# Patient Record
Sex: FEMALE | Race: WHITE | NOT HISPANIC OR LATINO | Employment: OTHER | URBAN - METROPOLITAN AREA
[De-identification: names, ages, dates, MRNs, and addresses within clinical notes are randomized per-mention and may not be internally consistent; named-entity substitution may affect disease eponyms.]

---

## 2017-04-28 ENCOUNTER — ALLSCRIPTS OFFICE VISIT (OUTPATIENT)
Dept: OTHER | Facility: OTHER | Age: 82
End: 2017-04-28

## 2017-05-03 ENCOUNTER — GENERIC CONVERSION - ENCOUNTER (OUTPATIENT)
Dept: OTHER | Facility: OTHER | Age: 82
End: 2017-05-03

## 2017-05-03 LAB
25(OH)D3 SERPL-MCNC: 25.8 NG/ML (ref 30–100)
A/G RATIO (HISTORICAL): 2 (ref 1.2–2.2)
ALBUMIN SERPL BCP-MCNC: 4.2 G/DL (ref 3.2–4.6)
ALP SERPL-CCNC: 67 IU/L (ref 39–117)
ALT SERPL W P-5'-P-CCNC: 9 IU/L (ref 0–32)
AST SERPL W P-5'-P-CCNC: 20 IU/L (ref 0–40)
BASOPHILS # BLD AUTO: 0 %
BASOPHILS # BLD AUTO: 0 X10E3/UL (ref 0–0.2)
BILIRUB SERPL-MCNC: 0.4 MG/DL (ref 0–1.2)
BUN SERPL-MCNC: 35 MG/DL (ref 10–36)
BUN/CREA RATIO (HISTORICAL): 24 (ref 12–28)
CALCIUM SERPL-MCNC: 10.5 MG/DL (ref 8.7–10.3)
CHLORIDE SERPL-SCNC: 103 MMOL/L (ref 96–106)
CO2 SERPL-SCNC: 24 MMOL/L (ref 18–29)
CREAT SERPL-MCNC: 1.45 MG/DL (ref 0.57–1)
DEPRECATED RDW RBC AUTO: 13.6 % (ref 12.3–15.4)
EGFR AFRICAN AMERICAN (HISTORICAL): 36 ML/MIN/1.73
EGFR-AMERICAN CALC (HISTORICAL): 31 ML/MIN/1.73
EOSINOPHIL # BLD AUTO: 0.1 X10E3/UL (ref 0–0.4)
EOSINOPHIL # BLD AUTO: 3 %
GLUCOSE SERPL-MCNC: 91 MG/DL (ref 65–99)
HCT VFR BLD AUTO: 37 % (ref 34–46.6)
HGB BLD-MCNC: 12.1 G/DL (ref 11.1–15.9)
IMM.GRANULOCYTES (CD4/8) (HISTORICAL): 0 %
IMM.GRANULOCYTES (CD4/8) (HISTORICAL): 0 X10E3/UL (ref 0–0.1)
LYMPHOCYTES # BLD AUTO: 1.5 X10E3/UL (ref 0.7–3.1)
LYMPHOCYTES # BLD AUTO: 32 %
MCH RBC QN AUTO: 30.5 PG (ref 26.6–33)
MCHC RBC AUTO-ENTMCNC: 32.7 G/DL (ref 31.5–35.7)
MCV RBC AUTO: 93 FL (ref 79–97)
MONOCYTES # BLD AUTO: 0.4 X10E3/UL (ref 0.1–0.9)
MONOCYTES (HISTORICAL): 9 %
NEUTROPHILS # BLD AUTO: 2.7 X10E3/UL (ref 1.4–7)
NEUTROPHILS # BLD AUTO: 56 %
PLATELET # BLD AUTO: 212 X10E3/UL (ref 150–379)
POTASSIUM SERPL-SCNC: 5.3 MMOL/L (ref 3.5–5.2)
RBC (HISTORICAL): 3.97 X10E6/UL (ref 3.77–5.28)
SODIUM SERPL-SCNC: 141 MMOL/L (ref 134–144)
TOT. GLOBULIN, SERUM (HISTORICAL): 2.1 G/DL (ref 1.5–4.5)
TOTAL PROTEIN (HISTORICAL): 6.3 G/DL (ref 6–8.5)
WBC # BLD AUTO: 4.8 X10E3/UL (ref 3.4–10.8)

## 2017-05-04 LAB
T4 FREE SERPL-MCNC: 1.63 NG/DL (ref 0.82–1.77)
TSH SERPL DL<=0.05 MIU/L-ACNC: 2.78 UIU/ML (ref 0.45–4.5)

## 2017-05-08 ENCOUNTER — GENERIC CONVERSION - ENCOUNTER (OUTPATIENT)
Dept: OTHER | Facility: OTHER | Age: 82
End: 2017-05-08

## 2017-05-09 ENCOUNTER — GENERIC CONVERSION - ENCOUNTER (OUTPATIENT)
Dept: OTHER | Facility: OTHER | Age: 82
End: 2017-05-09

## 2017-05-19 ENCOUNTER — GENERIC CONVERSION - ENCOUNTER (OUTPATIENT)
Dept: OTHER | Facility: OTHER | Age: 82
End: 2017-05-19

## 2017-09-07 ENCOUNTER — ALLSCRIPTS OFFICE VISIT (OUTPATIENT)
Dept: OTHER | Facility: OTHER | Age: 82
End: 2017-09-07

## 2018-01-11 NOTE — RESULT NOTES
Message   Recorded as Task   Date: 02/16/2016 07:22 AM, Created By: Choco Burgos   Task Name: Follow Up   Assigned To: Chemo Levy   Regarding Patient: Julianne Moreno, Status: In Progress   CommentDemiario Keith - 16 Feb 2016 7:22 AM     TASK CREATED  vision has some pyuria, call for she has symptoms of UTI we'll give her Bactrim double strength 1 by mouth twice a day Ã?3 days otherwise no treatment is necessary   Elma Chua - 16 Feb 2016 8:59 AM     TASK IN PROGRESS   Elma Chua - 16 Feb 2016 1:06 PM     TASK EDITED  Spoke to Mariposa Sher and gave results  She had seen her mother yesterday and she did not mention any UTI symptoms  She will check with her again to make sure and let us know  Otherwise, no antibiotic at this time is needed

## 2018-01-11 NOTE — RESULT NOTES
Message   Recorded as Task   Date: 05/04/2017 06:50 AM, Created By: Parul Lamas   Task Name: Follow Up   Assigned To: Sriram Cutler   Regarding Patient: Hamlet Rios, Status: In Progress   Kerline Marsh - 04 May 2017 6:50 AM     TASK CREATED  Her labs are stable however nice weekend we'll see her in follow-up later   Karla Eve - 08 May 2017 4:02 PM     TASK EDITED  I left a message for Gracie Kruse to call back     Karla Eve - 08 May 2017 4:02 PM     TASK IN PROGRESS   Urszula Sandoval - 09 May 2017 10:44 AM     TASK EDITED  Daughter called back, results were given to her and she will let the patient know

## 2018-01-12 NOTE — PROGRESS NOTES
Assessment    1  Hypertension (401 9) (I10)   2  Hypothyroidism (244 9) (E03 9)   3  Osteoarthritis (715 90) (M19 90)   4  Vitamin D deficiency (268 9) (E55 9)   5  Osteoporosis (733 00) (M81 0)    Plan  Hypertension    · (1) BASIC METABOLIC PROFILE; Status:Active; Requested USR:[id number];    · (1) T4, FREE; Status:Active; Requested JGJ:[id number];    · (1) TSH; Status:Active; Requested C73GBC1051;    · (1) VITAMIN D 25-HYDROXY; Status:Active; Requested PUM:84ICR8065;    · Follow-up visit in 4 Months Evaluation and Treatment  Follow-up  Status: Hold For -  Scheduling  Requested for: 2017   · Follow-up visit in 6 months Evaluation and Treatment  Follow-up  Status: Hold For -  Scheduling  Requested for: 75Aft3652  Need for prophylactic vaccination and inoculation against influenza    · Fluzone High-Dose 0 5 ML Intramuscular Suspension Prefilled Syringe  Osteoarthritis    · Diclofenac Sodium 1 % Transdermal Gel; Applied to the affected area back twice a  day  Osteoporosis    · (1) CBC/PLT/DIFF; Status:Active; Requested SJK:[id number];     Discussion/Summary    Pressure will check  Hypothyroidism replace  Osteoarthritis of the knee can take Tylenol and will give him bulk, no ointment to use  Otherwise she looks great she ambulates with a walker  She's had no falls    The blood pressure repeated by me 130/80 is excellent we will need to once the renal function and the potassium  The patient was counseled regarding diagnostic results, instructions for management, risk factor reductions, prognosis, patient and family education, impressions, risks and benefits of treatment options, importance of compliance with treatment  Possible side effects of new medications were reviewed with the patient/guardian today  The treatment plan was reviewed with the patient/guardian  The patient/guardian understands and agrees with the treatment plan      Chief Complaint  Physical   c/o bilateral knee pain        History of Present Illness  HPI: Problem 1 high blood pressure little elevated today by we'll check it again she takes lisinopril 10 mg per day  Problem #2 she has some osteoarthritis  And she has some knee pain that is occurring more so in the last couple weeks she takes Tylenol for pain relieve told her that she can probably take some diclofenac trans-derma gel that she can apply twice a day to see if it improves that should not affect the renal function very much  Chronic renal failure stage III has been stable  Depression and anxiety has been stable she takes mirtazapine 30 mg at bedtime  She takes vitamin D 3 at thousand units per day    Hypothyroidism she is on level thyroxine TSH and T4 has been normal will repeat them  Review of Systems    Constitutional: No fever, no chills, feels well, no tiredness, no recent weight gain or weight loss  Eyes: No complaints of eye pain, no red eyes, no eyesight problems, no discharge, no dry eyes, no itching of eyes  ENT: no complaints of earache, no loss of hearing, no nose bleeds, no nasal discharge, no sore throat, no hoarseness  Cardiovascular: No complaints of slow heart rate, no fast heart rate, no chest pain, no palpitations, no leg claudication, no lower extremity edema  Respiratory: No complaints of shortness of breath, no wheezing, no cough, no SOB on exertion, no orthopnea, no PND  Gastrointestinal: No complaints of abdominal pain, no constipation, no nausea or vomiting, no diarrhea, no bloody stools  Genitourinary: No complaints of dysuria, no incontinence, no pelvic pain, no dysmenorrhea, no vaginal discharge or bleeding  Musculoskeletal: No complaints of arthralgias, no myalgias, no joint swelling or stiffness, no limb pain or swelling and as noted in HPI    The patient presents with complaints of bilateral knee arthralgias  Integumentary: No complaints of skin rash or lesions, no itching, no skin wounds, no breast pain or lump  Neurological: No complaints of headache, no confusion, no convulsions, no numbness, no dizziness or fainting, no tingling, no limb weakness, no difficulty walking  Psychiatric: Not suicidal, no sleep disturbance, no anxiety or depression, no change in personality, no emotional problems  Endocrine: No complaints of proptosis, no hot flashes, no muscle weakness, no deepening of the voice, no feelings of weakness  Hematologic/Lymphatic: No complaints of swollen glands, no swollen glands in the neck, does not bleed easily, does not bruise easily  Active Problems    1  Acute bronchitis (466 0) (J20 9)   2  Basal cell carcinoma of skin (173 91) (C44 91)   3  Carbuncle, knee (680 6) (L02 439)   4  Cellulitis of right lower extremity (682 6) (L03 115)   5  Depression screening (V79 0) (Z13 89)   6  Encounter for screening mammogram for malignant neoplasm of breast (V76 12)   (Z12 31)   7  Glaucoma Screening   8  Hypertension (401 9) (I10)   9  Hypothyroidism (244 9) (E03 9)   10  Impacted cerumen, unspecified laterality (380 4) (H61 20)   11  Macular degeneration (362 50) (H35 30)   12  Need for pneumococcal vaccination (V03 82) (Z23)   13  Need for prophylactic vaccination and inoculation against influenza (V04 81) (Z23)   14  Osteoarthritis (715 90) (M19 90)   15  Osteoporosis (733 00) (M81 0)   16  Pre-op evaluation (V72 84) (Z01 818)   17  Screening for cholesterol level (V77 91) (Z13 220)   18  Screening for genitourinary condition (V81 6) (Z13 89)   19  Screening for neurological condition (V80 09) (Z13 89)   20  Squamous cell carcinoma of skin (173 92) (C44 92)   21   Vitamin D deficiency (268 9) (E55 9)    Past Medical History    · History of Depression with anxiety (300 4) (F41 8)   · Need for prophylactic vaccination and inoculation against influenza (V04 81) (Z23)    Surgical History    · History of Cataract Surgery   · History of Cholecystectomy    Family History  Father    · No pertinent family history    Social History    · Former smoker (N74 50) (O90 290)    Current Meds   1  Aspir-81 81 MG Oral Tablet Delayed Release; take 1 tablet every day; Therapy: 07Cqv9238 to Recorded   2  Capsaicin Hot Patch 0 025 % External Pad; Half or one patch in the affected area in the   back daily to take it on in the morning and off at bedtime; Therapy: 20ULT0508 to (ABSQYIZP:23XTS0556)  Requested for: 83NQB4125; Last   Rx:11Duh4649 Ordered   3  Debrox 6 5 % Otic Solution; 5-10 drops in each ear twice a day; Therapy: 44MAD7716 to [id number])  Requested for: 29NWI0752; Last   Rx:30Rse7849 Ordered   4  Levothyroxine Sodium 75 MCG Oral Tablet; TAKE 1 TABLET DAILY; Therapy: 29QOY5605 to (Last Rx:47Dmj2833)  Requested for: 68Gey4383 Ordered   5  Lisinopril 10 MG Oral Tablet; Take 1 tablet daily as directed; Therapy: 06DPS8622 to (Last Rx:33Pzw9334)  Requested for: 98Dpi5582 Ordered   6  Mirtazapine 30 MG Oral Tablet; take 1 tablet daily at bedtime; Therapy: 86NCJ5040 to (Last Daniel Re)  Requested for: 62Ceu6219 Ordered   7  Multi-Vitamin TABS; TAKE 1 TABLET DAILY; Therapy: (Recorded:89Pez3141) to Recorded   8  Tylenol 325 MG Oral Tablet; take 1-2 tablets tid prn; Therapy: 32ILA8465 to (Evaluate:27Jan2015)  Requested for: 93RFG1019; Last   Rx:94Bsh7028 Ordered   9  Vitamin D3 1000 UNIT Oral Tablet; Take one tablet daily; Therapy: 83Xxu3466 to Recorded    Allergies    1  HydroCHLOROthiazide TABS    Vitals   Recorded: 07Sep2017 10:45AM   Temperature 97 5 F   Heart Rate 80   Respiration 15   Systolic 815   Diastolic 79   Height 4 ft 11 5 in   Weight 141 lb    BMI Calculated 28   BSA Calculated 1 6     Blood pressure taken by me right arm sitting 130/70  Physical Exam    Constitutional   General appearance: Abnormal   chronically ill and overweight  Head and Face   Head and face: Normal     Palpation of the face and sinuses: No sinus tenderness      Eyes   Conjunctiva and lids: No swelling, erythema or discharge  Pupils and irises: Equal, round, reactive to light  Ears, Nose, Mouth, and Throat   External inspection of ears and nose: Normal     Otoscopic examination: Tympanic membranes translucent with normal light reflex  Canals patent without erythema  Nasal mucosa, septum, and turbinates: Normal without edema or erythema  Lips, teeth, and gums: Normal, good dentition  Oropharynx: Normal with no erythema, edema, exudate or lesions  Neck   Neck: Supple, symmetric, trachea midline, no masses  Thyroid: Normal, no thyromegaly  Pulmonary   Respiratory effort: No increased work of breathing or signs of respiratory distress  Auscultation of lungs: Clear to auscultation  Cardiovascular   Palpation of heart: Normal PMI, no thrills  Auscultation of heart: Abnormal   The rhythm was regular  Heart sounds: normal S1, normal S2 and no gallop heard  Examination of extremities for edema and/or varicosities: Normal     Abdomen   Abdomen: Non-tender, no masses  Liver and spleen: No hepatomegaly or splenomegaly  Lymphatic   Palpation of lymph nodes in neck: No lymphadenopathy  Musculoskeletal   Gait and station: Abnormal   Gait evaluation demonstrated antalgia bilaterally  Skin   Skin and subcutaneous tissue: Abnormal   dry skin  Psychiatric   Orientation to person, place, and time: Normal     Mood and affect: Normal        Health Management  Encounter for screening mammogram for malignant neoplasm of breast   Digital Bilateral Screening Mammogram With CAD; every 1 year; Last 34WGO7189; Next  Due: [id number]; Overdue  Osteoporosis   Dexa Scan; every 2 years; Last 63CPC1273; Next Due: 12PCJ1395;  Overdue    Signatures   Electronically signed by : GREG Stanton ; Sep  7 2017 11:27AM EST                       (Author)

## 2018-01-12 NOTE — MISCELLANEOUS
Message      Date: 28 Jun 2016 2:14 PM EST, Recorded By: Kj De La Cruz For: Monique Macias, Adult Child   Phone: [phone]   Mariposa Sher called to say she thinks her mother is depressed  She can't sleep, she has no appetite and she feels weak  As per Dr Damián Nam:  she should start taking Remeron (it has not been filled since Aug of 2015)  Rx for lab work was sent to the patient and appt  was given for 8 8  16  Active Problems    1  Acute bronchitis (466 0) (J20 9)   2  Basal cell carcinoma of skin (173 91) (C44 91)   3  Carbuncle, knee (680 6) (L02 439)   4  Cellulitis of right lower extremity (682 6) (L03 115)   5  Depression screening (V79 0) (Z13 89)   6  Encounter for screening mammogram for malignant neoplasm of breast (V76 12)   (Z12 31)   7  Glaucoma Screening   8  Hypertension (401 9) (I10)   9  Hypothyroidism (244 9) (E03 9)   10  Impacted cerumen, unspecified laterality (380 4) (H61 20)   11  Macular degeneration (362 50) (H35 30)   12  Need for pneumococcal vaccination (V03 82) (Z23)   13  Need for prophylactic vaccination and inoculation against influenza (V04 81) (Z23)   14  Osteoarthritis (715 90) (M19 90)   15  Osteoporosis (733 00) (M81 0)   16  Pre-op evaluation (V72 84) (Z01 818)   17  Screening for cholesterol level (V77 91) (Z13 220)   18  Screening for genitourinary condition (V81 6) (Z13 89)   19  Screening for neurological condition (V80 09) (Z13 89)   20  Squamous cell carcinoma of skin (173 92) (C44 92)   21  Vitamin D deficiency (268 9) (E55 9)    Current Meds   1  Aspir-81 81 MG Oral Tablet Delayed Release; take 1 tablet every day; Therapy: 05Xcp9218 to Recorded   2  Capsaicin Hot Patch 0 025 % External Pad; Half or one patch in the affected area in the   back daily to take it on in the morning and off at bedtime; Therapy: 82ZIQ6356 to (BIEDZCPF:82ATM1561)  Requested for: 44UOO3597; Last   Rx:64Uwn5924 Ordered   3   Debrox 6 5 % Otic Solution; 5-10 drops in each ear twice a day; Therapy: 76LFU9733 to 72 470 15 18)  Requested for: 07CRZ1922; Last   Rx:14Max2537 Ordered   4  Levothyroxine Sodium 75 MCG Oral Tablet; Take 1 tablet daily; Therapy: 50ZVP2792 to (Last Rx:87Lud6035)  Requested for: 63ERL3417 Ordered   5  Lisinopril 10 MG Oral Tablet; Take 1 tablet daily as directed; Therapy: 61ZLN8816 to (Last Rx:28Wjk2502)  Requested for: 55WSI9719 Ordered   6  Mirtazapine 30 MG Oral Tablet; take 1 tablet daily at bedtime; Therapy: 16BHH7768 to (Last Rx:61Dsh1261)  Requested for: 28Grq4757 Ordered   7  Multi-Vitamin TABS; TAKE 1 TABLET DAILY; Therapy: (Recorded:16Zzm9987) to Recorded   8  Tylenol 325 MG Oral Tablet; take 1-2 tablets tid prn; Therapy: 03MVI4914 to (Evaluate:27Jan2015)  Requested for: 85KUG1836; Last   Rx:50Uxm1349 Ordered   9  Vitamin D3 1000 UNIT Oral Tablet; Take one tablet daily; Therapy: 08Vrn5908 to Recorded    Allergies    1  Hydrochlorothiazide TABS    Plan  Hypertension, Hypothyroidism, Osteoarthritis    · (1) CBC/PLT/DIFF; Status:Active; Requested for:79Nsb7056;    · (1) COMPREHENSIVE METABOLIC PANEL; Status:Active; Requested for:25Bvv9530;    · (1) GGT; Status:Active; Requested for:72Eyw1502;    · (1) TSH; Status:Active; Requested for:73Pse1427;    · (1) URINALYSIS (will reflex a microscopy if leukocytes, occult blood, protein or nitrites  are not within normal limits); Status:Active; Requested for:03Kna5718;    · (1) T4, FREE; Status:Active; Requested for:71Jde2331;   Hypertension, Hypothyroidism, Screening for cholesterol level    · (1) C-REACTIVE PROTEIN; Status:Active - Retrospective Authorization; Requested  for:23Sqq3312;    · (1) LD (LDH); Status:Active - Retrospective Authorization; Requested for:57Cxe6092;    · (1) SED RATE; Status:Active - Retrospective Authorization;  Requested for:66Kec4544;     Signatures   Electronically signed by : GREG Reeves ; Jun 28 2016  3:35PM EST

## 2018-01-13 VITALS
HEIGHT: 60 IN | WEIGHT: 141 LBS | DIASTOLIC BLOOD PRESSURE: 79 MMHG | HEART RATE: 80 BPM | BODY MASS INDEX: 27.68 KG/M2 | SYSTOLIC BLOOD PRESSURE: 163 MMHG | TEMPERATURE: 97.5 F | RESPIRATION RATE: 15 BRPM

## 2018-01-13 NOTE — PROGRESS NOTES
Assessment   1  Hypertension (401 9) (I10)  2  Hypothyroidism (244 9) (E03 9)  3  Osteoarthritis (715 90) (M19 90)  4  Vitamin D deficiency (268 9) (E55 9)  5  Macular degeneration (362 50) (H35 30)    Plan  Hypertension, Hypothyroidism, Osteoarthritis, Vitamin D deficiency    · (1) BASIC METABOLIC PROFILE; Status:Active; Requested for:48Ufy9804;    · (1) CBC/PLT/DIFF; Status:Active; Requested for:50Jqy0712;    · (1) T4, FREE; Status:Active; Requested for:00Ooc5484;    · (1) TSH; Status:Active; Requested for:73Tmt9587;    · (1) VITAMIN D 25-HYDROXY; Status:Active; Requested for:83Ubc9077;    · Follow-up visit in 4 Months Evaluation and Treatment  Follow-up  Status: Hold For -  Scheduling  Requested for: 81Nan7683   · Follow-up visit in 6 months Evaluation and Treatment  Follow-up  Status: Hold For -  Scheduling  Requested for: 68Eqb5639  Need for prophylactic vaccination and inoculation against influenza    · Administered: Fluzone High-Dose 0 5 ML Intramuscular Suspension Prefilled Syringe    Discussion/Summary  health maintenance visit Currently, she eats a healthy diet  I believe she is doing wonderful 52 years of age  Her blood pressure was controlled she is active she's had no history of falls no history of incontinence  She takes her medication she felt that her own finances  She is socially engaged at the assisted living have no further recommendations will see her back in 4-6 months  She had the influenza vaccine today and she had the Prevnar vaccine this summer and a pneumococcal vaccine last year  She is up-to-date on immunizations  Chief Complaint  Annual medical exam       History of Present Illness  , Adult Female: The patient is being seen for a health maintenance evaluation  General Health: The patient's health since the last visit is described as good  She does not have regular dental visits  She complains of vision problems  She denies hearing loss   Immunizations status: up to date    Lifestyle:  She consumes a diverse and healthy diet  Reproductive health: the patient is postmenopausal    Screening:   metabolic screening reviewed and current  risk screening reviewed and current  HPI: 1 high blood pressure control lisinopril 10 mg per day  We'll continue same medications  Problem #2 hypothyroidism on thyroxine 75 Âµg per day her TSH and T4 is normal was recently review    Problem #3 history of depression and is now in remission she takes Remeron 30 mg at bedtime  She also takes vitamin D and aspirin for cardiovascular protection  She is here for an annual wellness visit  She resides at [facility]  She was given the high dose flu vaccine today  Review of Systems    Constitutional: No fever, no chills, feels well, no tiredness, no recent weight gain or weight loss  Eyes: No complaints of eye pain, no red eyes, no eyesight problems, no discharge, no dry eyes, no itching of eyes  ENT: no complaints of earache, no loss of hearing, no nose bleeds, no nasal discharge, no sore throat, no hoarseness  Cardiovascular: No complaints of slow heart rate, no fast heart rate, no chest pain, no palpitations, no leg claudication, no lower extremity edema  Respiratory: No complaints of shortness of breath, no wheezing, no cough, no SOB on exertion, no orthopnea, no PND  Gastrointestinal: No complaints of abdominal pain, no constipation, no nausea or vomiting, no diarrhea, no bloody stools  Genitourinary: No complaints of dysuria, no incontinence, no pelvic pain, no dysmenorrhea, no vaginal discharge or bleeding  Musculoskeletal: No complaints of arthralgias, no myalgias, no joint swelling or stiffness, no limb pain or swelling and as noted in HPI  Integumentary: No complaints of skin rash or lesions, no itching, no skin wounds, no breast pain or lump     Neurological: No complaints of headache, no confusion, no convulsions, no numbness, no dizziness or fainting, no tingling, no limb weakness, no difficulty walking  Psychiatric: Not suicidal, no sleep disturbance, no anxiety or depression, no change in personality, no emotional problems  Endocrine: No complaints of proptosis, no hot flashes, no muscle weakness, no deepening of the voice, no feelings of weakness  Hematologic/Lymphatic: No complaints of swollen glands, no swollen glands in the neck, does not bleed easily, does not bruise easily  Active Problems   1  Acute bronchitis (466 0) (J20 9)  2  Basal cell carcinoma of skin (173 91) (C44 91)  3  Carbuncle, knee (680 6) (L02 439)  4  Cellulitis of right lower extremity (682 6) (L03 115)  5  Depression screening (V79 0) (Z13 89)  6  Encounter for screening mammogram for malignant neoplasm of breast (V76 12)   (Z12 31)  7  Glaucoma Screening  8  Hypertension (401 9) (I10)  9  Hypothyroidism (244 9) (E03 9)  10  Impacted cerumen, unspecified laterality (380 4) (H61 20)  11  Macular degeneration (362 50) (H35 30)  12  Need for pneumococcal vaccination (V03 82) (Z23)  13  Need for prophylactic vaccination and inoculation against influenza (V04 81) (Z23)  14  Osteoarthritis (715 90) (M19 90)  15  Osteoporosis (733 00) (M81 0)  16  Pre-op evaluation (V72 84) (Z01 818)  17  Screening for cholesterol level (V77 91) (Z13 220)  18  Screening for genitourinary condition (V81 6) (Z13 89)  19  Screening for neurological condition (V80 09) (Z13 89)  20  Squamous cell carcinoma of skin (173 92) (C44 92)  21  Vitamin D deficiency (268 9) (E55 9)    Past Medical History    · History of Depression with anxiety (300 4) (F41 8)   · Need for prophylactic vaccination and inoculation against influenza (V04 81) (Z23)    Surgical History    · History of Cataract Surgery   · History of Cholecystectomy    Family History  Father    · No pertinent family history    Social History    · Former smoker (J96 96) (E22 055)    Current Meds  1   Aspir-81 81 MG Oral Tablet Delayed Release; take 1 tablet every day; Therapy: 48Ffu1946 to Recorded  2  Capsaicin Hot Patch 0 025 % External Pad; Half or one patch in the affected area in the   back daily to take it on in the morning and off at bedtime; Therapy: 87HUD8097 to (ZFWRLWYJ:99ZEQ0908)  Requested for: 08XAY7152; Last   Rx:29Jve3355 Ordered  3  Debrox 6 5 % Otic Solution; 5-10 drops in each ear twice a day; Therapy: 04KKF5626 to [phone])  Requested for: 38WVE8344; Last   Rx:40Gds9184 Ordered  4  Levothyroxine Sodium 75 MCG Oral Tablet; Take 1 tablet daily; Therapy: 35EKL6202 to (Last Rx:84Vzm9676)  Requested for: 93Lax5228 Ordered  5  Lisinopril 10 MG Oral Tablet; Take 1 tablet daily as directed; Therapy: 44HYQ8527 to (Last Rx:52Zcn0759)  Requested for: 55Chj0711 Ordered  6  Mirtazapine 30 MG Oral Tablet; take 1 tablet daily at bedtime; Therapy: 20OAJ3380 to (Last Rx:01Feq1794)  Requested for: 68Yjy8491 Ordered  7  Multi-Vitamin TABS; TAKE 1 TABLET DAILY; Therapy: (Recorded:50Kiq4355) to Recorded  8  Tylenol 325 MG Oral Tablet; take 1-2 tablets tid prn; Therapy: 57OEE7033 to (Evaluate:27Jan2015)  Requested for: 46BEW9894; Last   Rx:37Cwq6605 Ordered  9  Vitamin D3 1000 UNIT Oral Tablet; Take one tablet daily; Therapy: 40Gfk6179 to Recorded    Allergies   1  Hydrochlorothiazide TABS    Vitals   Recorded: 71RQS9465 10:85NJ   Systolic 463   Diastolic 82   Heart Rate 76   Respiration 15   Temperature 97 3 F   Height 4 ft 11 5 in   Weight 141 lb    BMI Calculated 28   BSA Calculated 1 6     Pressure 145/80 left arm sitting  Physical Exam    Constitutional   General appearance: Abnormal   appears healthy and within normal limits of ideal weight  Head and Face   Head and face: Normal     Palpation of the face and sinuses: No sinus tenderness  Eyes   Conjunctiva and lids: No swelling, erythema or discharge  Pupils and irises: Equal, round, reactive to light      Ears, Nose, Mouth, and Throat   External inspection of ears and nose: Normal     Lips, teeth, and gums: Normal, good dentition  Oropharynx: Normal with no erythema, edema, exudate or lesions  Neck   Neck: Supple, symmetric, trachea midline, no masses  Thyroid: Normal, no thyromegaly  Pulmonary   Respiratory effort: No increased work of breathing or signs of respiratory distress  Auscultation of lungs: Clear to auscultation  Cardiovascular   Palpation of heart: Normal PMI, no thrills  Auscultation of heart: Normal rate and rhythm, normal S1 and S2, no murmurs  Examination of extremities for edema and/or varicosities: Abnormal   bilateral ankle 1+ pitting edema  varicosities noted bilaterally  Abdomen   Abdomen: Non-tender, no masses  Liver and spleen: No hepatomegaly or splenomegaly  Lymphatic   Palpation of lymph nodes in neck: No lymphadenopathy  Musculoskeletal   Gait and station: Abnormal   Gait evaluation demonstrated ataxia  Muscle strength/tone: Normal     Skin   Skin and subcutaneous tissue: Normal without rashes or lesions  Psychiatric   Orientation to person, place, and time: Normal     Mood and affect: Normal        Results/Data  PHQ-2 Adult Depression Screening 27Gut1860 08:36AM User, s     Test Name Result Flag Reference   PHQ-2 Adult Depression Score 0     Over the last two weeks, how often have you been bothered by any of the following problems? Little interest or pleasure in doing things: Not at all - 0  Feeling down, depressed, or hopeless: Not at all - 0   PHQ-2 Adult Depression Screening Negative         Health Management  Encounter for screening mammogram for malignant neoplasm of breast   Digital Bilateral Screening Mammogram With CAD; every 1 year; Last 22ALJ6914; Next  Due: M1310148; Overdue  Osteoporosis   Dexa Scan; every 2 years; Last 30JMS2203; Next Due: 61ILE4267; Overdue    Future Appointments    Date/Time Provider Specialty Site   12/14/2016 09:45 AM GREG Marks   [facility] Signatures   Electronically signed by : GREG Zelaya ; Sep 15 2016  9:02AM EST                       (Author)

## 2018-01-14 VITALS
RESPIRATION RATE: 15 BRPM | BODY MASS INDEX: 28.07 KG/M2 | HEIGHT: 60 IN | TEMPERATURE: 97.8 F | HEART RATE: 64 BPM | DIASTOLIC BLOOD PRESSURE: 73 MMHG | WEIGHT: 143 LBS | SYSTOLIC BLOOD PRESSURE: 135 MMHG

## 2018-01-23 NOTE — PROGRESS NOTES
Assessment    1  Pre-op evaluation (V72 84) (Z01 818)   2  Squamous cell carcinoma of skin (173 92) (C44 92)   3  Hypertension (401 9) (I10)   4  Hypothyroidism (244 9) (E03 9)   5  Vitamin D deficiency (268 9) (E55 9)    Plan  Hypertension, Hypothyroidism, Osteoporosis, Pre-op evaluation, Vitamin D deficiency    · (1) APTT; Status:Active; Requested for:41Blb4287;    · (1) CBC/PLT/DIFF; Status:Active; Requested for:52Dba3737;    · (1) COMPREHENSIVE METABOLIC PANEL; Status:Active; Requested for:79Zin5576;    · (1) GGT; Status:Active; Requested for:40Eqj8388;    · (1) PT WITH INR; Status:Active; Requested for:17Xth7789;    · (1) T4, FREE; Status:Active; Requested for:88Zre2976;    · (1) TSH; Status:Active; Requested for:92Fge4215;    · (1) URINALYSIS (will reflex a microscopy if leukocytes, occult blood, protein or nitrites  are not within normal limits); Status:Active; Requested for:52Zks0696;    · (1) VITAMIN D 25-HYDROXY; Status:Active; Requested for:00Ena6585;    · Follow-up visit in 2 months Evaluation and Treatment  Follow-up  Status: Complete   Done: 54WMC6475   · Follow-up visit in 3 months Evaluation and Treatment  Follow-up  Status: Complete   Done: 60DWJ8983  Pre-op evaluation    · EKG/ECG- POC; Status:Complete - Retrospective Authorization;   Done: 11VRA6184  09:57AM    Discussion/Summary  Surgical Clearance: She is at a LOW risk from a cardiovascular standpoint at this time without any additional cardiac testing  Reevaluation needed, if she should present with symptoms prior to surgery/procedure  Return to Work: Approved  The day of surgery she will do the following  Take lisinopril 10 mg her blood pressure pill  Level thyroxine 75 Âµg for her thyroid  Hold the aspirin hold the multivitamins and all other medications  The next day she can resume her routine medications    EKG shows sinus rhythm rate of 60 no acute changes normal tracing        Chief Complaint  Pre-op eval: remove nodular lesion from fight leg / Dr Rissa Marsh / no date  History of Present Illness  Pre-Op Visit (Brief): The patient is being seen for a preoperative visit  Surgical Risk Assessment:   Prior Anesthesia: She had prior anesthesia  Exercise Capacity: unable to walk two flights of stairs without symptoms, but able to walk four blocks without symptoms  Lifestyle Factors: denies alcohol use, denies tobacco use and denies illegal drug use  Symptoms: no symptoms  HPI: She is here for preop evaluation to remove the lesion on the right leg  Started be done in the hospital under anesthesia  Also had a nodular lesion that has been there for quite some time and is going to be performed by Dr Rissa Marsh  Blood pressure control no chest and palpitation shortness of breath    Hypothyroidism she takes level thyroxine         Review of Systems    Constitutional: No fever, no chills, feels well, no tiredness, no recent weight gain or weight loss  Eyes: No complaints of eye pain, no red eyes, no eyesight problems, no discharge, no dry eyes, no itching of eyes  ENT: no complaints of earache, no loss of hearing, no nose bleeds, no nasal discharge, no sore throat, no hoarseness  Cardiovascular: No complaints of slow heart rate, no fast heart rate, no chest pain, no palpitations, no leg claudication, no lower extremity edema  Respiratory: No complaints of shortness of breath, no wheezing, no cough, no SOB on exertion, no orthopnea, no PND  Gastrointestinal: No complaints of abdominal pain, no constipation, no nausea or vomiting, no diarrhea, no bloody stools  Genitourinary: No complaints of dysuria, no incontinence, no pelvic pain, no dysmenorrhea, no vaginal discharge or bleeding  Musculoskeletal: No complaints of arthralgias, no myalgias, no joint swelling or stiffness, no limb pain or swelling  Integumentary: No complaints of skin rash or lesions, no itching, no skin wounds, no breast pain or lump     Neurological: No complaints of headache, no confusion, no convulsions, no numbness, no dizziness or fainting, no tingling, no limb weakness, no difficulty walking  Psychiatric: Not suicidal, no sleep disturbance, no anxiety or depression, no change in personality, no emotional problems  Endocrine: No complaints of proptosis, no hot flashes, no muscle weakness, no deepening of the voice, no feelings of weakness  Hematologic/Lymphatic: No complaints of swollen glands, no swollen glands in the neck, does not bleed easily, does not bruise easily  Active Problems    1  Acute bronchitis (466 0) (J20 9)   2  Basal cell carcinoma of skin (173 91) (C44 91)   3  Carbuncle, knee (680 6) (L02 439)   4  Cellulitis of right lower extremity (682 6) (L03 115)   5  Depression screening (V79 0) (Z13 89)   6  Encounter for screening mammogram for malignant neoplasm of breast (V76 12)   (Z12 31)   7  Glaucoma Screening   8  Hypertension (401 9) (I10)   9  Hypothyroidism (244 9) (E03 9)   10  Impacted cerumen, unspecified laterality (380 4) (H61 20)   11  Macular degeneration (362 50) (H35 30)   12  Need for pneumococcal vaccination (V03 82) (Z23)   13  Need for prophylactic vaccination and inoculation against influenza (V04 81) (Z23)   14  Osteoarthritis (715 90) (M19 90)   15  Osteoporosis (733 00) (M81 0)   16  Screening for cholesterol level (V77 91) (Z13 220)   17  Screening for genitourinary condition (V81 6) (Z13 89)   18  Screening for neurological condition (V80 09) (Z13 89)   19  Squamous cell carcinoma of skin (173 92) (C44 92)   20   Vitamin D deficiency (268 9) (E55 9)    Past Medical History    · History of Depression with anxiety (300 4) (F41 8)   · Need for prophylactic vaccination and inoculation against influenza (V04 81) (Z23)    Surgical History    · History of Cataract Surgery   · History of Cholecystectomy    Family History    · No pertinent family history    Social History    · Former smoker (F50 90) (O28 651)    Current Meds   1  Aspir-81 81 MG Oral Tablet Delayed Release; take 1 tablet every day; Therapy: 64Dpr9599 to Recorded   2  Capsaicin Hot Patch 0 025 % External Pad; Half or one patch in the affected area in the   back daily to take it on in the morning and off at bedtime; Therapy: 72QMP1138 to (HTQSCOK57PUN5028)  Requested for: 13NZA3228; Last   Rx:77Epm7440 Ordered   3  Debrox 6 5 % Otic Solution; 5-10 drops in each ear twice a day; Therapy: 37HEL8953 to [phone])  Requested for: 02FFP5744; Last   Rx:39Axu4098 Ordered   4  Levothyroxine Sodium 75 MCG Oral Tablet; Take 1 tablet daily; Therapy: 65ULW8506 to (Last Rx:90Bps2759)  Requested for: 96FVP5014 Ordered   5  Lisinopril 10 MG Oral Tablet; Take 1 tablet daily as directed; Therapy: 51EZJ6042 to (Last Rx:07Emu0012)  Requested for: 09SAA7437 Ordered   6  Mirtazapine 30 MG Oral Tablet; take 1 tablet daily at bedtime; Therapy: 67FXF2333 to (Last Rx:2015)  Requested for: 45Eak7749 Ordered   7  Multi-Vitamin TABS; TAKE 1 TABLET DAILY; Therapy: (Recorded:82Hyf7672) to Recorded   8  Tylenol 325 MG Oral Tablet; take 1-2 tablets tid prn; Therapy: 48AWP4315 to (Evaluate:2015)  Requested for: 08XLU2097; Last   Rx:68Fuz2936 Ordered   9  Vitamin D3 1000 UNIT Oral Tablet; Take one tablet daily; Therapy: 45Ouj6950 to Recorded    Allergies    1  Hydrochlorothiazide TABS    Vitals   Recorded: 57EQZ4873 09:42AM   Temperature 97 9 F   Heart Rate 80   Respiration 15   Systolic 985   Diastolic 76   Height 4 ft 11 5 in   Weight 147 lb 4 oz   BMI Calculated 29 24   BSA Calculated 1 63     Physical Exam    Constitutional   General appearance: Abnormal   appears healthy and overweight  Head and Face   Head and face: Normal     Palpation of the face and sinuses: No sinus tenderness  Eyes   Conjunctiva and lids: No swelling, erythema or discharge  Pupils and irises: Equal, round, reactive to light      Ears, Nose, Mouth, and Throat   External inspection of ears and nose: Normal     Otoscopic examination: Tympanic membranes translucent with normal light reflex  Canals patent without erythema  Nasal mucosa, septum, and turbinates: Normal without edema or erythema  Lips, teeth, and gums: Normal, good dentition  Oropharynx: Normal with no erythema, edema, exudate or lesions  Neck   Neck: Supple, symmetric, trachea midline, no masses  Thyroid: Normal, no thyromegaly  Pulmonary   Respiratory effort: No increased work of breathing or signs of respiratory distress  Auscultation of lungs: Clear to auscultation  Cardiovascular   Palpation of heart: Normal PMI, no thrills  Auscultation of heart: Normal rate and rhythm, normal S1 and S2, no murmurs  Carotid pulses: 2+ bilaterally  Examination of extremities for edema and/or varicosities: Normal     Abdomen   Abdomen: Non-tender, no masses  Liver and spleen: No hepatomegaly or splenomegaly  Lymphatic   Palpation of lymph nodes in neck: No lymphadenopathy  Skin   Examination of the skin for lesions: Abnormal   A single, pink-salmon nodule(s)  on the right shin  Neurologic   Cranial nerves: Cranial nerves II-XII intact  Psychiatric   Orientation to person, place, and time: Normal     Mood and affect: Normal        Results/Data  EKG/ECG- POC 46NYS2100 09:57AM Annabel Skill     Test Name Result Flag Reference   EKG/ECG 2 10 16         End of Encounter Meds    1  Mirtazapine 30 MG Oral Tablet; take 1 tablet daily at bedtime; Therapy: 34EBK2977 to (Last Rx:11Aug2015)  Requested for: 11Aug2015 Ordered    2  Lisinopril 10 MG Oral Tablet; Take 1 tablet daily as directed; Therapy: 57VVH7324 to (Last Rx:16Gbg6210)  Requested for: 86HZK4295 Ordered    3  Tylenol 325 MG Oral Tablet; take 1-2 tablets tid prn; Therapy: 19LSB2764 to (Evaluate:27Jan2015)  Requested for: 44ZMT5765; Last   Rx:47Vek1115 Ordered    4  Levothyroxine Sodium 75 MCG Oral Tablet;  Take 1 tablet daily; Therapy: 59TGH8865 to (Last Rx:94Juy8099)  Requested for: 66JOZ8490 Ordered    5  Debrox 6 5 % Otic Solution; 5-10 drops in each ear twice a day; Therapy: 97CPK6457 to 96 263734)  Requested for: 58KRR6389; Last   Rx:54Uno8153 Ordered    6  Capsaicin Hot Patch 0 025 % External Pad; Half or one patch in the affected area in the   back daily to take it on in the morning and off at bedtime; Therapy: 02CDG1381 to (ZLLEFXHN:46WSR7848)  Requested for: 71XMP9723; Last   Rx:35Esm1887 Ordered    7  Vitamin D3 1000 UNIT Oral Tablet; Take one tablet daily; Therapy: 28Xls4875 to Recorded    8  Aspir-81 81 MG Oral Tablet Delayed Release; take 1 tablet every day; Therapy: 70Buw0473 to Recorded   9  Multi-Vitamin TABS; TAKE 1 TABLET DAILY; Therapy: (Recorded:24Dvb9906) to Recorded    Future Appointments    Date/Time Provider Specialty Site   04/26/2016 09:45 AM GREG Ring   Internal Medicine [facility]     Signatures   Electronically signed by : GREG Stanton ; Feb 10 2016 10:26AM EST                       (Author)

## 2018-01-23 NOTE — CONSULTS
Chief Complaint  Pre-op eval: remove nodular lesion from fight leg / Dr Joan Marlow / no date  History of Present Illness  Pre-Op Visit (Brief): The patient is being seen for a preoperative visit  Surgical Risk Assessment:   Prior Anesthesia: She had prior anesthesia  Exercise Capacity: unable to walk two flights of stairs without symptoms, but able to walk four blocks without symptoms  Lifestyle Factors: denies alcohol use, denies tobacco use and denies illegal drug use  Symptoms: no symptoms  HPI: She is here for preop evaluation to remove the lesion on the right leg  Started be done in the hospital under anesthesia  Also had a nodular lesion that has been there for quite some time and is going to be performed by Dr Joan Marlow  Blood pressure control no chest and palpitation shortness of breath    Hypothyroidism she takes level thyroxine         Review of Systems    Constitutional: No fever, no chills, feels well, no tiredness, no recent weight gain or weight loss  Eyes: No complaints of eye pain, no red eyes, no eyesight problems, no discharge, no dry eyes, no itching of eyes  ENT: no complaints of earache, no loss of hearing, no nose bleeds, no nasal discharge, no sore throat, no hoarseness  Cardiovascular: No complaints of slow heart rate, no fast heart rate, no chest pain, no palpitations, no leg claudication, no lower extremity edema  Respiratory: No complaints of shortness of breath, no wheezing, no cough, no SOB on exertion, no orthopnea, no PND  Gastrointestinal: No complaints of abdominal pain, no constipation, no nausea or vomiting, no diarrhea, no bloody stools  Genitourinary: No complaints of dysuria, no incontinence, no pelvic pain, no dysmenorrhea, no vaginal discharge or bleeding  Musculoskeletal: No complaints of arthralgias, no myalgias, no joint swelling or stiffness, no limb pain or swelling     Integumentary: No complaints of skin rash or lesions, no itching, no skin wounds, no breast pain or lump  Neurological: No complaints of headache, no confusion, no convulsions, no numbness, no dizziness or fainting, no tingling, no limb weakness, no difficulty walking  Psychiatric: Not suicidal, no sleep disturbance, no anxiety or depression, no change in personality, no emotional problems  Endocrine: No complaints of proptosis, no hot flashes, no muscle weakness, no deepening of the voice, no feelings of weakness  Hematologic/Lymphatic: No complaints of swollen glands, no swollen glands in the neck, does not bleed easily, does not bruise easily  Active Problems    1  Acute bronchitis (466 0) (J20 9)   2  Basal cell carcinoma of skin (173 91) (C44 91)   3  Carbuncle, knee (680 6) (L02 439)   4  Cellulitis of right lower extremity (682 6) (L03 115)   5  Depression screening (V79 0) (Z13 89)   6  Encounter for screening mammogram for malignant neoplasm of breast (V76 12)   (Z12 31)   7  Glaucoma Screening   8  Hypertension (401 9) (I10)   9  Hypothyroidism (244 9) (E03 9)   10  Impacted cerumen, unspecified laterality (380 4) (H61 20)   11  Macular degeneration (362 50) (H35 30)   12  Need for pneumococcal vaccination (V03 82) (Z23)   13  Need for prophylactic vaccination and inoculation against influenza (V04 81) (Z23)   14  Osteoarthritis (715 90) (M19 90)   15  Osteoporosis (733 00) (M81 0)   16  Screening for cholesterol level (V77 91) (Z13 220)   17  Screening for genitourinary condition (V81 6) (Z13 89)   18  Screening for neurological condition (V80 09) (Z13 89)   19  Squamous cell carcinoma of skin (173 92) (C44 92)   20   Vitamin D deficiency (268 9) (E55 9)    Past Medical History    · History of Depression with anxiety (300 4) (F41 8)   · Need for prophylactic vaccination and inoculation against influenza (V04 81) (Z23)    Surgical History    · History of Cataract Surgery   · History of Cholecystectomy    Family History    · No pertinent family history    Social History    · Former smoker (B14 80) (V64 136)    Current Meds   1  Aspir-81 81 MG Oral Tablet Delayed Release; take 1 tablet every day; Therapy: 74Juw9893 to Recorded   2  Capsaicin Hot Patch 0 025 % External Pad; Half or one patch in the affected area in the   back daily to take it on in the morning and off at bedtime; Therapy: 80OUZ0910 to (AGITDUDL:48PAP1241)  Requested for: 54MYQ0377; Last   Rx:70Exh9819 Ordered   3  Debrox 6 5 % Otic Solution; 5-10 drops in each ear twice a day; Therapy: 74OPH0669 to 927 70 139)  Requested for: 12OCV6052; Last   Rx:55Xbt0772 Ordered   4  Levothyroxine Sodium 75 MCG Oral Tablet; Take 1 tablet daily; Therapy: 57GQJ7828 to (Last Rx:50Usp9200)  Requested for: 67JEQ6713 Ordered   5  Lisinopril 10 MG Oral Tablet; Take 1 tablet daily as directed; Therapy: 39ATN0892 to (Last Rx:14Hcn2783)  Requested for: 90UJX8851 Ordered   6  Mirtazapine 30 MG Oral Tablet; take 1 tablet daily at bedtime; Therapy: 36VHZ3659 to (Last Rx:11Aug2015)  Requested for: 43Jul7484 Ordered   7  Multi-Vitamin TABS; TAKE 1 TABLET DAILY; Therapy: (Recorded:88Aan2246) to Recorded   8  Tylenol 325 MG Oral Tablet; take 1-2 tablets tid prn; Therapy: 41BFL9830 to (Evaluate:27Jan2015)  Requested for: 31DFP6780; Last   Rx:15Jso2364 Ordered   9  Vitamin D3 1000 UNIT Oral Tablet; Take one tablet daily; Therapy: 58Toy2921 to Recorded    Allergies    1  Hydrochlorothiazide TABS    Vitals  Signs [Data Includes: Current Encounter]    Temperature: 97 9 F  Heart Rate: 80  Respiration: 15  Systolic: 527  Diastolic: 76  Height: 4 ft 11 5 in  Weight: 147 lb 4 oz  BMI Calculated: 29 24  BSA Calculated: 1 63    Physical Exam    Constitutional   General appearance: Abnormal   appears healthy and overweight  Head and Face   Head and face: Normal     Palpation of the face and sinuses: No sinus tenderness  Eyes   Conjunctiva and lids: No swelling, erythema or discharge      Pupils and irises: Equal, round, reactive to light  Ears, Nose, Mouth, and Throat   External inspection of ears and nose: Normal     Otoscopic examination: Tympanic membranes translucent with normal light reflex  Canals patent without erythema  Nasal mucosa, septum, and turbinates: Normal without edema or erythema  Lips, teeth, and gums: Normal, good dentition  Oropharynx: Normal with no erythema, edema, exudate or lesions  Neck   Neck: Supple, symmetric, trachea midline, no masses  Thyroid: Normal, no thyromegaly  Pulmonary   Respiratory effort: No increased work of breathing or signs of respiratory distress  Auscultation of lungs: Clear to auscultation  Cardiovascular   Palpation of heart: Normal PMI, no thrills  Auscultation of heart: Normal rate and rhythm, normal S1 and S2, no murmurs  Carotid pulses: 2+ bilaterally  Examination of extremities for edema and/or varicosities: Normal     Abdomen   Abdomen: Non-tender, no masses  Liver and spleen: No hepatomegaly or splenomegaly  Lymphatic   Palpation of lymph nodes in neck: No lymphadenopathy  Skin   Examination of the skin for lesions: Abnormal   A single, pink-salmon nodule(s)  on the right shin  Neurologic   Cranial nerves: Cranial nerves II-XII intact  Psychiatric   Orientation to person, place, and time: Normal     Mood and affect: Normal        Results/Data  EKG/ECG- POC [id number] 09:57AM Artelia Rigoberto     Test Name Result Flag Reference   EKG/ECG 2 10 16         Assessment    1  Pre-op evaluation (V72 84) (Z01 818)   2  Squamous cell carcinoma of skin (173 92) (C44 92)   3  Hypertension (401 9) (I10)   4  Hypothyroidism (244 9) (E03 9)   5  Vitamin D deficiency (268 9) (E55 9)    Plan  Hypertension, Hypothyroidism, Osteoporosis, Pre-op evaluation, Vitamin D deficiency    · (1) APTT; Status:Active; Requested for:57Xpm0232;    · (1) CBC/PLT/DIFF; Status:Active;  Requested for:31Tmb8009;    · (1) COMPREHENSIVE METABOLIC PANEL; Status:Active; Requested for:10Feb2016;    · (1) GGT; Status:Active; Requested for:10Feb2016;    · (1) PT WITH INR; Status:Active; Requested for:10Feb2016;    · (1) T4, FREE; Status:Active; Requested for:10Feb2016;    · (1) TSH; Status:Active; Requested for:10Feb2016;    · (1) URINALYSIS (will reflex a microscopy if leukocytes, occult blood, protein or nitrites are  not within normal limits); Status:Active; Requested for:10Feb2016;    · (1) VITAMIN D 25-HYDROXY; Status:Active; Requested for:10Feb2016;    · Follow-up visit in 2 months Evaluation and Treatment  Follow-up  Status: Complete   Done: 62QJR4995   · Follow-up visit in 3 months Evaluation and Treatment  Follow-up  Status: Complete   Done: 18CJH4598  Pre-op evaluation    · EKG/ECG- POC; Status:Complete - Retrospective Authorization;   Done: 80OCY1293  09:57AM    Discussion/Summary  Surgical Clearance: She is at a LOW risk from a cardiovascular standpoint at this time without any additional cardiac testing  Reevaluation needed, if she should present with symptoms prior to surgery/procedure  Return to Work: Approved  The day of surgery she will do the following  Take lisinopril 10 mg her blood pressure pill  Level thyroxine 75 Âµg for her thyroid  Hold the aspirin hold the multivitamins and all other medications  The next day she can resume her routine medications    EKG shows sinus rhythm rate of 60 no acute changes normal tracing  End of Encounter Meds    1  Mirtazapine 30 MG Oral Tablet; take 1 tablet daily at bedtime; Therapy: 38HID5651 to (Last Rx:11Aug2015)  Requested for: 11Aug2015 Ordered    2  Lisinopril 10 MG Oral Tablet; Take 1 tablet daily as directed; Therapy: 57FWJ1246 to (Last Rx:65Bmp4868)  Requested for: 14ZBQ4384 Ordered    3  Tylenol 325 MG Oral Tablet; take 1-2 tablets tid prn; Therapy: 10HTU0571 to (Evaluate:27Jan2015)  Requested for: 14OYX7812; Last   Rx:46Gev7054 Ordered    4  Levothyroxine Sodium 75 MCG Oral Tablet;  Take 1 tablet daily; Therapy: 25CNP4417 to (Last Rx:37Npb8369)  Requested for: 79XWL9555 Ordered    5  Debrox 6 5 % Otic Solution; 5-10 drops in each ear twice a day; Therapy: 54NML5693 to [phone])  Requested for: 68AIM8739; Last   Rx:94Kko3007 Ordered    6  Capsaicin Hot Patch 0 025 % External Pad; Half or one patch in the affected area in the   back daily to take it on in the morning and off at bedtime; Therapy: 82YLM6689 to (QCFFSBBX:43PEH4023)  Requested for: 15OIK8791; Last   Rx:78Lkl1197 Ordered    7  Vitamin D3 1000 UNIT Oral Tablet; Take one tablet daily; Therapy: 34Lrt1393 to Recorded    8  Aspir-81 81 MG Oral Tablet Delayed Release; take 1 tablet every day; Therapy: 26Moq3991 to Recorded   9  Multi-Vitamin TABS; TAKE 1 TABLET DAILY;    Therapy: (Recorded:58Xnu2451) to Recorded    Signatures   Electronically signed by : GREG Larson ; Feb 10 2016 10:26AM EST                       (Author)

## 2018-02-15 DIAGNOSIS — E03.9 ACQUIRED HYPOTHYROIDISM: ICD-10-CM

## 2018-02-15 DIAGNOSIS — I10 ESSENTIAL HYPERTENSION: Primary | ICD-10-CM

## 2018-02-15 RX ORDER — LEVOTHYROXINE SODIUM 0.07 MG/1
TABLET ORAL
Qty: 90 TABLET | Refills: 0 | Status: SHIPPED | OUTPATIENT
Start: 2018-02-15 | End: 2018-05-16 | Stop reason: SDUPTHER

## 2018-02-15 RX ORDER — LISINOPRIL 10 MG/1
TABLET ORAL
Qty: 90 TABLET | Refills: 0 | Status: SHIPPED | OUTPATIENT
Start: 2018-02-15 | End: 2018-05-16 | Stop reason: SDUPTHER

## 2018-02-24 DIAGNOSIS — F32.A DEPRESSION, UNSPECIFIED DEPRESSION TYPE: Primary | ICD-10-CM

## 2018-02-24 RX ORDER — MIRTAZAPINE 30 MG/1
TABLET, FILM COATED ORAL
Qty: 90 TABLET | Refills: 0 | Status: SHIPPED | OUTPATIENT
Start: 2018-02-24 | End: 2018-05-25 | Stop reason: SDUPTHER

## 2018-04-12 ENCOUNTER — OFFICE VISIT (OUTPATIENT)
Dept: INTERNAL MEDICINE CLINIC | Facility: CLINIC | Age: 83
End: 2018-04-12
Payer: COMMERCIAL

## 2018-04-12 VITALS
HEART RATE: 61 BPM | BODY MASS INDEX: 27.57 KG/M2 | WEIGHT: 138.8 LBS | RESPIRATION RATE: 15 BRPM | SYSTOLIC BLOOD PRESSURE: 133 MMHG | DIASTOLIC BLOOD PRESSURE: 69 MMHG | TEMPERATURE: 97.4 F

## 2018-04-12 DIAGNOSIS — F41.8 DEPRESSION WITH ANXIETY: ICD-10-CM

## 2018-04-12 DIAGNOSIS — E03.9 ACQUIRED HYPOTHYROIDISM: ICD-10-CM

## 2018-04-12 DIAGNOSIS — I10 ESSENTIAL HYPERTENSION: Primary | ICD-10-CM

## 2018-04-12 PROCEDURE — 99214 OFFICE O/P EST MOD 30 MIN: CPT | Performed by: INTERNAL MEDICINE

## 2018-04-12 RX ORDER — ASPIRIN 81 MG/1
1 TABLET ORAL DAILY
COMMUNITY
Start: 2013-09-18 | End: 2019-01-15 | Stop reason: HOSPADM

## 2018-04-12 RX ORDER — BIOTIN 1 MG
1 TABLET ORAL DAILY
COMMUNITY
Start: 2014-12-22 | End: 2018-10-10 | Stop reason: CLARIF

## 2018-04-12 NOTE — ASSESSMENT & PLAN NOTE
Is in remission continue on Remeron she says she has gone" to all but she is still hanging in there ambulating with a walker no complaints in joining a extensive family of Children's and grandchildren

## 2018-04-12 NOTE — PATIENT INSTRUCTIONS
DASH Eating Plan   WHAT YOU NEED TO KNOW:   The DASH (Dietary Approaches to Stop Hypertension) Eating Plan is designed to help prevent or lower high blood pressure  It can also help to lower LDL (bad) cholesterol and decrease your risk of heart disease  The plan is low in sodium, sugar, unhealthy fats, and total fat  It is high in potassium, calcium, magnesium, and fiber  These nutrients are added when you eat more fruits, vegetables, and whole grains  DISCHARGE INSTRUCTIONS:   Your sodium limit each day: Your dietitian will tell you how much sodium is safe for you to have each day  People with high blood pressure should have no more than 1,500 to 2,300 mg of sodium in a day  A teaspoon (tsp) of salt has 2,300 mg of sodium  This may seem like a difficult goal, but small changes to the foods you eat can make a big difference  Your healthcare provider or dietitian can help you create a meal plan that follows your sodium limit  How to limit sodium:   · Read food labels  Food labels can help you choose foods that are low in sodium  The amount of sodium is listed in milligrams (mg)  The % Daily Value (DV) column tells you how much of your daily needs are met by 1 serving of the food for each nutrient listed  Choose foods that have less than 5% of the DV of sodium  These foods are considered low in sodium  Foods that have 20% or more of the DV of sodium are considered high in sodium  Avoid foods that have more than 300 mg of sodium in each serving  Choose foods that say low-sodium, reduced-sodium, or no salt added on the food label  · Avoid salt  Do not salt food at the table, and add very little salt to foods during cooking  Use herbs and spices, such as onions, garlic, and salt-free seasonings to add flavor to foods  Try lemon or lime juice or vinegar to give foods a tart flavor  Use hot peppers or a small amount of hot pepper sauce to add a spicy flavor to foods  · Ask about salt substitutes    Ask your healthcare provider if you may use salt substitutes  Some salt substitutes have ingredients that can be harmful if you have certain health conditions  · Choose foods carefully at restaurants  Meals from restaurants, especially fast food restaurants, are often high in sodium  Some restaurants have nutrition information that tells you the amount of sodium in their foods  Ask to have your food prepared with less, or no salt  What you need to know about fats:   · Include healthy fats  Examples are unsaturated fats and omega-3 fatty acids  Unsaturated fats are found in soybean, canola, olive, or sunflower oil, and liquid and soft tub margarines  Omega-3 fatty acids are found in fatty fish, such as salmon, tuna, mackerel, and sardines  It is also found in flaxseed oil and ground flaxseed  · Avoid unhealthy fats  Do not eat unhealthy fats, such as saturated fats and trans fats  Saturated fats are found in foods that contain fat from animals  Examples are fatty meats, whole milk, butter, cream, and other dairy foods  It is also found in shortening, stick margarine, palm oil, and coconut oil  Trans fats are found in fried foods, crackers, chips, and baked goods made with margarine or shortening  Foods to include: With the DASH eating plan, you need to eat a certain number of servings from each food group  This will help you get enough of certain nutrients and limit others  The amount of servings you should eat depends on how many calories you need  Your dietitian can tell you how many calories you need  The number of servings listed next to the food groups below are for people who need about 2,000 calories each day    · Grains:  6 to 8 servings (3 of these servings should be whole-grain foods)    ¨ 1 slice of whole-grain bread     ¨ 1 ounce of dry cereal    ¨ ½ cup of cooked cereal, pasta, or brown rice    · Vegetables and fruits:  4 to 5 servings of fruits and 4 to 5 servings of vegetables    ¨ 1 medium fruit    ¨ ½ cup of frozen, canned (no added salt), or chopped fresh vegetables     ¨ ½ cup of fresh, frozen, dried, or canned fruit (canned in light syrup or fruit juice)    ¨ ½ cup of vegetable or fruit juice    · Dairy:  2 to 3 servings    ¨ 1 cup of nonfat (skim) or 1% milk    ¨ 1½ ounces of fat-free or low-fat cheese    ¨ 6 ounces of nonfat or low-fat yogurt    · Lean meat, poultry, and fish:  6 ounces or less    [employer] (chicken, turkey) with no skin    ¨ Fish (especially fatty fish, such as salmon, fresh tuna, or mackerel)    ¨ Lean beef and pork (loin, round, extra lean hamburger)    ¨ Egg whites and egg substitutes    · Nuts, seeds, and legumes:  4 to 5 servings each week    ¨ ½ cup of cooked beans and peas    ¨ 1½ ounces of unsalted nuts    ¨ 2 tablespoons of peanut butter or seeds    · Sweets and added sugars:  5 or less each week    ¨ 1 tablespoon of sugar, jelly, or jam    ¨ ½ cup of sorbet or gelatin    ¨ 1 cup of lemonade    · Fats:  2 to 3 servings each week    ¨ 1 teaspoon of soft margarine or vegetable oil    ¨ 1 tablespoon of mayonnaise    ¨ 2 tablespoons of salad dressing  Foods to avoid:   · Grains:      [employer], such as doughnuts, pastries, cookies, and biscuits (high in fat and sugar)    ¨ Mixes for cornbread and biscuits, packaged foods, such as bread stuffing, rice and pasta mixes, macaroni and cheese, and instant cereals (high in sodium)    · Fruits and vegetables:      ¨ Regular, canned vegetables (high in sodium)    ¨ Sauerkraut, pickled vegetables, and other foods prepared in brine (high in sodium)    ¨ Fried vegetables or vegetables in butter or high-fat sauces    ¨ Fruit in cream or butter sauce (high in fat)    · Dairy:      ¨ Whole milk, 2% milk, and cream (high in fat)    ¨ Regular cheese and processed cheese (high in fat and sodium)    · Meats and protein foods:      ¨ Smoked or cured meat, such as corned beef, leal, ham, hot dogs, and sausage (high in fat and sodium)    ¨ Canned beans and canned meats or spreads, such as potted meats, sardines, anchovies, and imitation seafood (high in sodium)    ¨ Deli or lunch meats, such as bologna, ham, turkey, and roast beef (high in sodium)    ¨ High-fat meat (T-bone steak, regular hamburger, and ribs)    ¨ Whole eggs and egg yolks (high in fat)    · Other:      ¨ Seasonings made with salt, such as garlic salt, celery salt, onion salt, seasoned salt, meat tenderizers, and monosodium glutamate (MSG)    ¨ Miso soup and canned or dried soup mixes (high in sodium)    ¨ Regular soy sauce, barbecue sauce, teriyaki sauce, steak sauce, Worcestershire sauce, and most flavored vinegars (high in sodium)    ¨ Regular condiments, such as mustard, ketchup, and salad dressings (high in sodium)    ¨ Gravy and sauces, such as Yamil or cheese sauces (high in sodium and fat)    ¨ Drinks high in sugar, such as soda or fruit drinks    [employer] foods, such as salted chips, popcorn, pretzels, pork rinds, salted crackers, and salted nuts    ¨ Frozen foods, such as dinners, entrees, vegetables with sauces, and breaded meats (high in sodium)  Other guidelines to follow:   · Maintain a healthy weight  Your risk for heart disease is higher if you are overweight  Your healthcare provider may suggest that you lose weight if you are overweight  You can lose weight by eating fewer calories and foods that have added sugars and fat  The DASH meal plan can help you do this  Decrease calories by eating smaller portions at each meal and fewer snacks  Ask your healthcare provider for more information about how to lose weight  · Exercise regularly  Regular exercise can help you reach or maintain a healthy weight  Regular exercise can also help decrease your blood pressure and improve your cholesterol levels  Get 30 minutes or more of moderate exercise each day of the week  To lose weight, get at least 60 minutes of exercise   Talk to your healthcare provider about the best exercise program for you  · Limit alcohol  Women should limit alcohol to 1 drink a day  Men should limit alcohol to 2 drinks a day  A drink of alcohol is 12 ounces of beer, 5 ounces of wine, or 1½ ounces of liquor  © 2017 [address] Information is for End User's use only and may not be sold, redistributed or otherwise used for commercial purposes  All illustrations and images included in CareNotes® are the copyrighted property of [employer]  or Pradip Baez  The above information is an  only  It is not intended as medical advice for individual conditions or treatments  Talk to your doctor, nurse or pharmacist before following any medical regimen to see if it is safe and effective for you      Your blood pressure stable schedule labs done so we can adjust her levothyroxine if needed continue all other medications I will see her back in 6 months in the fall and give you the flu shot

## 2018-04-17 LAB
CHOLEST SERPL-MCNC: 161 MG/DL (ref 50–200)
HDLC SERPL-MCNC: 53 MG/DL (ref 40–60)
LDLC SERPL DIRECT ASSAY-MCNC: 91 MG/DL
TRIGL SERPL-MCNC: 86 MG/DL (ref ?–150)

## 2018-04-22 LAB
25(OH)D3+25(OH)D2 SERPL-MCNC: 22.3 NG/ML (ref 30–100)
ALBUMIN SERPL-MCNC: 4 G/DL (ref 3.2–4.6)
ALBUMIN/GLOB SERPL: 1.7 {RATIO} (ref 1.2–2.2)
ALP SERPL-CCNC: 68 IU/L (ref 39–117)
ALT SERPL-CCNC: 8 IU/L (ref 0–32)
AMBIG ABBREV DEFAULT: NORMAL
APPEARANCE UR: CLEAR
AST SERPL-CCNC: 18 IU/L (ref 0–40)
BACTERIA URNS QL MICRO: NORMAL
BASOPHILS # BLD AUTO: 0 X10E3/UL (ref 0–0.2)
BASOPHILS NFR BLD AUTO: 0 %
BILIRUB SERPL-MCNC: 0.4 MG/DL (ref 0–1.2)
BILIRUB UR QL STRIP: NEGATIVE
BUN SERPL-MCNC: 35 MG/DL (ref 10–36)
BUN/CREAT SERPL: 25 (ref 12–28)
CALCIUM SERPL-MCNC: 10.4 MG/DL (ref 8.7–10.3)
CHLORIDE SERPL-SCNC: 105 MMOL/L (ref 96–106)
CHOLEST SERPL-MCNC: 161 MG/DL (ref 100–199)
CO2 SERPL-SCNC: 22 MMOL/L (ref 18–29)
COLOR UR: YELLOW
CREAT SERPL-MCNC: 1.41 MG/DL (ref 0.57–1)
EOSINOPHIL # BLD AUTO: 0.2 X10E3/UL (ref 0–0.4)
EOSINOPHIL NFR BLD AUTO: 3 %
EPI CELLS #/AREA URNS HPF: NORMAL /HPF
ERYTHROCYTE [DISTWIDTH] IN BLOOD BY AUTOMATED COUNT: 14 % (ref 12.3–15.4)
GGT SERPL-CCNC: 10 IU/L (ref 0–60)
GLOBULIN SER-MCNC: 2.4 G/DL (ref 1.5–4.5)
GLUCOSE SERPL-MCNC: 98 MG/DL (ref 65–99)
GLUCOSE UR QL: NEGATIVE
HCT VFR BLD AUTO: 38.5 % (ref 34–46.6)
HDLC SERPL-MCNC: 53 MG/DL
HGB BLD-MCNC: 12.6 G/DL (ref 11.1–15.9)
HGB UR QL STRIP: NEGATIVE
IMM GRANULOCYTES # BLD: 0 X10E3/UL (ref 0–0.1)
IMM GRANULOCYTES NFR BLD: 0 %
KETONES UR QL STRIP: NEGATIVE
LDLC SERPL CALC-MCNC: 91 MG/DL (ref 0–99)
LEUKOCYTE ESTERASE UR QL STRIP: ABNORMAL
LYMPHOCYTES # BLD AUTO: 2.3 X10E3/UL (ref 0.7–3.1)
LYMPHOCYTES NFR BLD AUTO: 40 %
MAGNESIUM SERPL-MCNC: 2.1 MG/DL (ref 1.6–2.3)
MCH RBC QN AUTO: 30.9 PG (ref 26.6–33)
MCHC RBC AUTO-ENTMCNC: 32.7 G/DL (ref 31.5–35.7)
MCV RBC AUTO: 94 FL (ref 79–97)
MICRO URNS: ABNORMAL
MONOCYTES # BLD AUTO: 0.5 X10E3/UL (ref 0.1–0.9)
MONOCYTES NFR BLD AUTO: 9 %
MUCOUS THREADS URNS QL MICRO: PRESENT
NEUTROPHILS # BLD AUTO: 2.7 X10E3/UL (ref 1.4–7)
NEUTROPHILS NFR BLD AUTO: 48 %
NITRITE UR QL STRIP: NEGATIVE
PH UR STRIP: 5 [PH] (ref 5–7.5)
PLATELET # BLD AUTO: 229 X10E3/UL (ref 150–379)
POTASSIUM SERPL-SCNC: 5 MMOL/L (ref 3.5–5.2)
PROT SERPL-MCNC: 6.4 G/DL (ref 6–8.5)
PROT UR QL STRIP: NEGATIVE
RBC # BLD AUTO: 4.08 X10E6/UL (ref 3.77–5.28)
RBC #/AREA URNS HPF: NORMAL /HPF
SL AMB EGFR AFRICAN AMERICAN: 37 ML/MIN/1.73
SL AMB EGFR NON AFRICAN AMERICAN: 32 ML/MIN/1.73
SODIUM SERPL-SCNC: 140 MMOL/L (ref 134–144)
SP GR UR: 1.02 (ref 1–1.03)
TRIGL SERPL-MCNC: 86 MG/DL (ref 0–149)
TSH SERPL DL<=0.005 MIU/L-ACNC: 1.8 UIU/ML (ref 0.45–4.5)
UROBILINOGEN UR STRIP-ACNC: 0.2 EU/DL (ref 0.2–1)
WBC # BLD AUTO: 5.6 X10E3/UL (ref 3.4–10.8)
WBC #/AREA URNS HPF: NORMAL /HPF

## 2018-05-16 DIAGNOSIS — I10 ESSENTIAL HYPERTENSION: ICD-10-CM

## 2018-05-16 DIAGNOSIS — E03.9 ACQUIRED HYPOTHYROIDISM: ICD-10-CM

## 2018-05-16 RX ORDER — LEVOTHYROXINE SODIUM 0.07 MG/1
TABLET ORAL
Qty: 90 TABLET | Refills: 0 | Status: SHIPPED | OUTPATIENT
Start: 2018-05-16 | End: 2018-08-14 | Stop reason: SDUPTHER

## 2018-05-16 RX ORDER — LISINOPRIL 10 MG/1
TABLET ORAL
Qty: 90 TABLET | Refills: 0 | Status: SHIPPED | OUTPATIENT
Start: 2018-05-16 | End: 2018-08-14 | Stop reason: SDUPTHER

## 2018-05-25 DIAGNOSIS — F32.A DEPRESSION, UNSPECIFIED DEPRESSION TYPE: ICD-10-CM

## 2018-05-25 RX ORDER — MIRTAZAPINE 30 MG/1
TABLET, FILM COATED ORAL
Qty: 90 TABLET | Refills: 0 | Status: SHIPPED | OUTPATIENT
Start: 2018-05-25 | End: 2018-08-23 | Stop reason: SDUPTHER

## 2018-07-30 LAB
A-TOCOPHEROL VIT E SERPL-MCNC: 11.6 MG/L (ref 9–29)
GAMMA TOCOPHEROL SERPL-MCNC: 0.8 MG/L (ref 0.5–4.9)

## 2018-08-08 ENCOUNTER — OFFICE VISIT (OUTPATIENT)
Dept: INTERNAL MEDICINE CLINIC | Facility: CLINIC | Age: 83
End: 2018-08-08
Payer: COMMERCIAL

## 2018-08-08 VITALS
RESPIRATION RATE: 16 BRPM | WEIGHT: 138.6 LBS | HEART RATE: 78 BPM | DIASTOLIC BLOOD PRESSURE: 70 MMHG | SYSTOLIC BLOOD PRESSURE: 142 MMHG | TEMPERATURE: 97.9 F | BODY MASS INDEX: 27.53 KG/M2

## 2018-08-08 DIAGNOSIS — I10 ESSENTIAL HYPERTENSION: ICD-10-CM

## 2018-08-08 DIAGNOSIS — E03.9 ACQUIRED HYPOTHYROIDISM: Primary | ICD-10-CM

## 2018-08-08 DIAGNOSIS — F41.8 DEPRESSION WITH ANXIETY: ICD-10-CM

## 2018-08-08 DIAGNOSIS — E55.9 VITAMIN D DEFICIENCY: ICD-10-CM

## 2018-08-08 PROCEDURE — 1101F PT FALLS ASSESS-DOCD LE1/YR: CPT | Performed by: INTERNAL MEDICINE

## 2018-08-08 PROCEDURE — 1160F RVW MEDS BY RX/DR IN RCRD: CPT | Performed by: INTERNAL MEDICINE

## 2018-08-08 PROCEDURE — 99214 OFFICE O/P EST MOD 30 MIN: CPT | Performed by: INTERNAL MEDICINE

## 2018-08-08 NOTE — PROGRESS NOTES
Assessment/Plan:    Hypothyroidism  TSH last checked in April was within normal limits, continue current dose of levothyroxine    Essential hypertension  At goal for her age, no episodes of orthostatic hypertension, continue current dose of lisinopril    Depression with anxiety  Continue Remeron, cognitively intact, able to self medicate    Vitamin D deficiency  Continue daily vitamin-D supplement    Up-to-date on pneumonia vaccination, influenza vaccine to be done in the fall, no indication of continuing screening mammography or colonoscopy at her age  Discussed the importance of adequate nutrition and continued physical activity as tolerated  Diagnoses and all orders for this visit:    Acquired hypothyroidism    Essential hypertension    Depression with anxiety    Vitamin D deficiency          Subjective:   Chief Complaint   Patient presents with    Physical Exam    Leg Swelling        Patient ID: Gloria Hussein is a 80 y o  female  She comes in for follow-up of hypertension, hypothyroidism, depression and anxiety  She notes that she was told that she has had increased swelling in the legs but she notes that they have been feeling better for the last few days  No significant pain  Able to ambulate without any difficulty  Taking blood pressure medication regularly  Mood has been stable and does sleep well  The following portions of the patient's history were reviewed and updated as appropriate: current medications, past medical history, past social history and past surgical history  PHQ-9 Depression Screening    PHQ-9:    Frequency of the following problems over the past two weeks:                Current Outpatient Prescriptions:     aspirin (ASPIR-81) 81 mg EC tablet, Take 1 tablet by mouth daily, Disp: , Rfl:     Cholecalciferol (VITAMIN D3) 1000 units CAPS, Take 1 tablet by mouth daily, Disp: , Rfl:     levothyroxine 75 mcg tablet, TAKE 1 TABLET DAILY  , Disp: 90 tablet, Rfl: 0   lisinopril (ZESTRIL) 10 mg tablet, TAKE 1 TABLET DAILY AS DIRECTED, Disp: 90 tablet, Rfl: 0    mirtazapine (REMERON) 30 mg tablet, TAKE 1 TABLET DAILY AT BEDTIME, Disp: 90 tablet, Rfl: 0    Review of Systems   Constitutional: Negative for fatigue, fever and unexpected weight change  HENT: Negative for ear pain, hearing loss and sore throat  Eyes: Negative for pain and discharge  Respiratory: Negative for cough, chest tightness and shortness of breath  Cardiovascular: Negative for chest pain and palpitations  Gastrointestinal: Negative for abdominal pain, blood in stool, constipation, diarrhea and nausea  Genitourinary: Negative for dysuria, frequency and hematuria  Musculoskeletal: Negative for arthralgias and joint swelling  Skin: Negative for rash  Allergic/Immunologic: Negative for immunocompromised state  Neurological: Negative for dizziness and headaches  Hematological: Negative for adenopathy  Psychiatric/Behavioral: Negative for confusion and sleep disturbance  Objective:  /70 (BP Location: Left arm, Patient Position: Sitting, Cuff Size: Standard)   Pulse 78   Temp 97 9 °F (36 6 °C)   Resp 16   Wt 62 9 kg (138 lb 9 6 oz)   BMI 27 53 kg/m²      Physical Exam   Constitutional: She appears well-developed and well-nourished  HENT:   Head: Normocephalic and atraumatic  Right Ear: Tympanic membrane normal    Left Ear: Tympanic membrane normal    Nose: Nose normal    Mouth/Throat: Oropharynx is clear and moist  No posterior oropharyngeal edema or posterior oropharyngeal erythema  Eyes: Conjunctivae are normal  Pupils are equal, round, and reactive to light  Right eye exhibits no discharge  Neck: Normal range of motion  Neck supple  No thyromegaly present  Cardiovascular: Normal rate, regular rhythm, S1 normal, S2 normal and normal heart sounds  PMI is not displaced  No murmur heard    No pedal edema, no calf tenderness, negative Hohmann sign, dry skin but no ulceration   Pulmonary/Chest: Effort normal and breath sounds normal  No accessory muscle usage  No apnea  No respiratory distress  She has no rhonchi  She has no rales  Abdominal: Soft  Normal appearance and bowel sounds are normal  She exhibits no shifting dullness  There is no hepatosplenomegaly  There is no tenderness  There is no rebound and no CVA tenderness  Musculoskeletal: Normal range of motion  She exhibits no edema or tenderness  Lymphadenopathy:     She has no cervical adenopathy  Neurological: She is alert  Skin: Skin is warm and intact  No rash noted  Psychiatric: She has a normal mood and affect  Her speech is normal    Nursing note and vitals reviewed  No results found for this or any previous visit (from the past 1008 hour(s))  ]    No results found

## 2018-08-14 DIAGNOSIS — I10 ESSENTIAL HYPERTENSION: ICD-10-CM

## 2018-08-14 DIAGNOSIS — E03.9 ACQUIRED HYPOTHYROIDISM: ICD-10-CM

## 2018-08-14 RX ORDER — LEVOTHYROXINE SODIUM 0.07 MG/1
TABLET ORAL
Qty: 90 TABLET | Refills: 0 | Status: SHIPPED | OUTPATIENT
Start: 2018-08-14 | End: 2018-11-12 | Stop reason: SDUPTHER

## 2018-08-14 RX ORDER — LISINOPRIL 10 MG/1
TABLET ORAL
Qty: 90 TABLET | Refills: 0 | Status: SHIPPED | OUTPATIENT
Start: 2018-08-14 | End: 2018-11-12 | Stop reason: SDUPTHER

## 2018-08-23 DIAGNOSIS — F32.A DEPRESSION, UNSPECIFIED DEPRESSION TYPE: ICD-10-CM

## 2018-08-23 RX ORDER — MIRTAZAPINE 30 MG/1
TABLET, FILM COATED ORAL
Qty: 90 TABLET | Refills: 0 | Status: SHIPPED | OUTPATIENT
Start: 2018-08-23 | End: 2018-11-21 | Stop reason: SDUPTHER

## 2018-10-10 ENCOUNTER — OFFICE VISIT (OUTPATIENT)
Dept: INTERNAL MEDICINE CLINIC | Facility: CLINIC | Age: 83
End: 2018-10-10
Payer: COMMERCIAL

## 2018-10-10 VITALS
HEART RATE: 63 BPM | BODY MASS INDEX: 27.01 KG/M2 | TEMPERATURE: 97.2 F | DIASTOLIC BLOOD PRESSURE: 73 MMHG | RESPIRATION RATE: 15 BRPM | SYSTOLIC BLOOD PRESSURE: 150 MMHG | WEIGHT: 136 LBS

## 2018-10-10 DIAGNOSIS — E55.9 VITAMIN D DEFICIENCY: ICD-10-CM

## 2018-10-10 DIAGNOSIS — H61.23 CERUMEN DEBRIS ON TYMPANIC MEMBRANE OF BOTH EARS: ICD-10-CM

## 2018-10-10 DIAGNOSIS — E03.9 ACQUIRED HYPOTHYROIDISM: Primary | ICD-10-CM

## 2018-10-10 DIAGNOSIS — F41.8 DEPRESSION WITH ANXIETY: ICD-10-CM

## 2018-10-10 DIAGNOSIS — I10 ESSENTIAL HYPERTENSION: ICD-10-CM

## 2018-10-10 DIAGNOSIS — Z23 NEED FOR INFLUENZA VACCINATION: ICD-10-CM

## 2018-10-10 PROCEDURE — 90662 IIV NO PRSV INCREASED AG IM: CPT | Performed by: INTERNAL MEDICINE

## 2018-10-10 PROCEDURE — 99214 OFFICE O/P EST MOD 30 MIN: CPT | Performed by: INTERNAL MEDICINE

## 2018-10-10 PROCEDURE — 4040F PNEUMOC VAC/ADMIN/RCVD: CPT | Performed by: INTERNAL MEDICINE

## 2018-10-10 PROCEDURE — G0008 ADMIN INFLUENZA VIRUS VAC: HCPCS | Performed by: INTERNAL MEDICINE

## 2018-10-10 PROCEDURE — 1036F TOBACCO NON-USER: CPT | Performed by: INTERNAL MEDICINE

## 2018-10-10 RX ORDER — DIPHENOXYLATE HYDROCHLORIDE AND ATROPINE SULFATE 2.5; .025 MG/1; MG/1
1 TABLET ORAL DAILY
COMMUNITY

## 2018-10-10 NOTE — PROGRESS NOTES
Assessment/Plan:    No problem-specific Assessment & Plan notes found for this encounter  There are no diagnoses linked to this encounter  Subjective:      Patient ID: Marita Galeas is a 80 y o  female  No chief complaint on file  Current Outpatient Prescriptions:     aspirin (ASPIR-81) 81 mg EC tablet, Take 1 tablet by mouth daily, Disp: , Rfl:     Cholecalciferol (VITAMIN D3) 1000 units CAPS, Take 1 tablet by mouth daily, Disp: , Rfl:     levothyroxine 75 mcg tablet, TAKE 1 TABLET DAILY  , Disp: 90 tablet, Rfl: 0    lisinopril (ZESTRIL) 10 mg tablet, TAKE 1 TABLET DAILY AS DIRECTED, Disp: 90 tablet, Rfl: 0    mirtazapine (REMERON) 30 mg tablet, TAKE 1 TABLET DAILY AT BEDTIME, Disp: 90 tablet, Rfl: 0    Resident at [facility]  Ambulates with a rolling walker she is sleeping more she feels more tired my Goodness she is 80years of age she looks wonderful and she is coming in here to see me  She has had no falls in eyes any chest pain palpitation shortness of breath her blood pressure is acceptable and will check her thyroid function I reviewed the labs from April which were good  Including lipids and thyroid function studies        The following portions of the patient's history were reviewed and updated as appropriate: allergies, current medications, past family history, past medical history, past social history, past surgical history and problem list     Review of Systems   Constitutional: Positive for fatigue  Negative for activity change, appetite change, fever and unexpected weight change  HENT: Negative for congestion, ear pain, hearing loss, mouth sores, postnasal drip, rhinorrhea, sore throat, trouble swallowing and voice change  Eyes: Negative for pain, redness and visual disturbance  Respiratory: Negative for cough, chest tightness, shortness of breath and wheezing  Cardiovascular: Negative for chest pain, palpitations and leg swelling  Gastrointestinal: Negative for abdominal distention, abdominal pain, blood in stool, constipation, diarrhea and nausea  Endocrine: Negative for cold intolerance, heat intolerance, polydipsia, polyphagia and polyuria  Genitourinary: Negative for difficulty urinating, dysuria, flank pain, frequency, hematuria and urgency  Musculoskeletal: Negative for arthralgias, back pain, gait problem, joint swelling and myalgias  Skin: Negative for color change and pallor  Neurological: Negative for dizziness, tremors, seizures, syncope, weakness, numbness and headaches  Hematological: Negative for adenopathy  Does not bruise/bleed easily  Psychiatric/Behavioral: Negative  Negative for sleep disturbance  The patient is not nervous/anxious  Objective:    Results for orders placed or performed in visit on 04/23/18   Lipid panel   Result Value Ref Range    Triglycerides 86 150 mg/dL    Cholesterol 161 50 - 200 mg/dL    HDL, Direct 53 40 - 60 mg/dL    LDL Cholesterol 91        There were no vitals taken for this visit  Physical Exam   Constitutional: She is oriented to person, place, and time  She appears well-developed and well-nourished  HENT:   Head: Normocephalic  Right Ear: External ear normal    Left Ear: External ear normal    Nose: Nose normal    Mouth/Throat: Oropharynx is clear and moist  No oropharyngeal exudate  Cerumen impaction in both ear she has decreasing hearing will give her Debrox  ear drops to use 5 drops in each ear for 4 days only   Eyes: Pupils are equal, round, and reactive to light  Conjunctivae and EOM are normal    Neck: Normal range of motion  Neck supple  No thyromegaly present  Cardiovascular: Normal rate, regular rhythm, normal heart sounds and intact distal pulses  Exam reveals no gallop and no friction rub  No murmur heard  S1-S2 no gallops  Extremities no edema   Pulmonary/Chest: Effort normal and breath sounds normal  No respiratory distress   She has no wheezes  She has no rales  Lungs are clear no wheezing rales or rhonchi   Abdominal: Soft  Bowel sounds are normal  She exhibits no distension and no mass  There is no tenderness  There is no rebound and no guarding  Abdomen soft nontender   Musculoskeletal: Normal range of motion  Lymphadenopathy:     She has no cervical adenopathy  Neurological: She is alert and oriented to person, place, and time  Skin: Skin is warm and dry  Dry skin throughout   Psychiatric: She has a normal mood and affect  Her behavior is normal  Judgment normal    Nursing note and vitals reviewed

## 2018-10-10 NOTE — PATIENT INSTRUCTIONS
Blood pressure is control  Continue same medications  To was give you some drops to dissolve the wax in your ears just uses for 4 days 5 drops in each ear

## 2018-10-10 NOTE — ASSESSMENT & PLAN NOTE
Seems to be stable denies depression she has been on Remeron for quite a long time will continue the medication at bedtime

## 2018-11-12 DIAGNOSIS — I10 ESSENTIAL HYPERTENSION: ICD-10-CM

## 2018-11-12 DIAGNOSIS — E03.9 ACQUIRED HYPOTHYROIDISM: ICD-10-CM

## 2018-11-12 RX ORDER — LISINOPRIL 10 MG/1
TABLET ORAL
Qty: 90 TABLET | Refills: 0 | Status: SHIPPED | OUTPATIENT
Start: 2018-11-12 | End: 2019-01-15 | Stop reason: HOSPADM

## 2018-11-12 RX ORDER — LEVOTHYROXINE SODIUM 0.07 MG/1
TABLET ORAL
Qty: 90 TABLET | Refills: 0 | Status: SHIPPED | OUTPATIENT
Start: 2018-11-12 | End: 2019-01-15 | Stop reason: HOSPADM

## 2018-11-21 DIAGNOSIS — F32.A DEPRESSION, UNSPECIFIED DEPRESSION TYPE: ICD-10-CM

## 2018-11-21 RX ORDER — MIRTAZAPINE 30 MG/1
TABLET, FILM COATED ORAL
Qty: 90 TABLET | Refills: 0 | Status: SHIPPED | OUTPATIENT
Start: 2018-11-21 | End: 2019-02-19 | Stop reason: SDUPTHER

## 2019-01-08 ENCOUNTER — APPOINTMENT (EMERGENCY)
Dept: RADIOLOGY | Facility: HOSPITAL | Age: 84
End: 2019-01-08
Payer: COMMERCIAL

## 2019-01-08 ENCOUNTER — HOSPITAL ENCOUNTER (OUTPATIENT)
Facility: HOSPITAL | Age: 84
Setting detail: OBSERVATION
Discharge: HOME | End: 2019-01-10
Attending: EMERGENCY MEDICINE | Admitting: INTERNAL MEDICINE
Payer: COMMERCIAL

## 2019-01-08 DIAGNOSIS — R77.8 ELEVATED TROPONIN: ICD-10-CM

## 2019-01-08 DIAGNOSIS — F41.8 DEPRESSION WITH ANXIETY: ICD-10-CM

## 2019-01-08 DIAGNOSIS — I21.4 NSTEMI (NON-ST ELEVATED MYOCARDIAL INFARCTION) (HCC): Primary | ICD-10-CM

## 2019-01-08 DIAGNOSIS — R53.1 GENERALIZED WEAKNESS: ICD-10-CM

## 2019-01-08 PROBLEM — N18.30 STAGE 3 CHRONIC KIDNEY DISEASE (HCC): Status: ACTIVE | Noted: 2019-01-08

## 2019-01-08 LAB
ALBUMIN SERPL BCP-MCNC: 3.4 G/DL (ref 3.5–5)
ALP SERPL-CCNC: 69 U/L (ref 46–116)
ALT SERPL W P-5'-P-CCNC: 15 U/L (ref 12–78)
ANION GAP SERPL CALCULATED.3IONS-SCNC: 4 MMOL/L (ref 4–13)
AST SERPL W P-5'-P-CCNC: 22 U/L (ref 5–45)
ATRIAL RATE: 136 BPM
BACTERIA UR QL AUTO: ABNORMAL /HPF
BASOPHILS # BLD AUTO: 0.02 THOUSANDS/ΜL (ref 0–0.1)
BASOPHILS NFR BLD AUTO: 0 % (ref 0–1)
BILIRUB SERPL-MCNC: 0.43 MG/DL (ref 0.2–1)
BILIRUB UR QL STRIP: NEGATIVE
BUN SERPL-MCNC: 27 MG/DL (ref 5–25)
CALCIUM SERPL-MCNC: 10.1 MG/DL (ref 8.3–10.1)
CHLORIDE SERPL-SCNC: 111 MMOL/L (ref 100–108)
CLARITY UR: CLEAR
CO2 SERPL-SCNC: 25 MMOL/L (ref 21–32)
COLOR UR: YELLOW
COLOR, POC: NORMAL
CREAT SERPL-MCNC: 1.23 MG/DL (ref 0.6–1.3)
EOSINOPHIL # BLD AUTO: 0.15 THOUSAND/ΜL (ref 0–0.61)
EOSINOPHIL NFR BLD AUTO: 3 % (ref 0–6)
ERYTHROCYTE [DISTWIDTH] IN BLOOD BY AUTOMATED COUNT: 13.3 % (ref 11.6–15.1)
GFR SERPL CREATININE-BSD FRML MDRD: 37 ML/MIN/1.73SQ M
GLUCOSE SERPL-MCNC: 98 MG/DL (ref 65–140)
GLUCOSE UR STRIP-MCNC: NEGATIVE MG/DL
HCT VFR BLD AUTO: 42.1 % (ref 34.8–46.1)
HGB BLD-MCNC: 13.2 G/DL (ref 11.5–15.4)
HGB UR QL STRIP.AUTO: ABNORMAL
IMM GRANULOCYTES # BLD AUTO: 0.02 THOUSAND/UL (ref 0–0.2)
IMM GRANULOCYTES NFR BLD AUTO: 0 % (ref 0–2)
KETONES UR STRIP-MCNC: NEGATIVE MG/DL
LEUKOCYTE ESTERASE UR QL STRIP: ABNORMAL
LYMPHOCYTES # BLD AUTO: 1.56 THOUSANDS/ΜL (ref 0.6–4.47)
LYMPHOCYTES NFR BLD AUTO: 26 % (ref 14–44)
MCH RBC QN AUTO: 30.8 PG (ref 26.8–34.3)
MCHC RBC AUTO-ENTMCNC: 31.4 G/DL (ref 31.4–37.4)
MCV RBC AUTO: 98 FL (ref 82–98)
MONOCYTES # BLD AUTO: 0.56 THOUSAND/ΜL (ref 0.17–1.22)
MONOCYTES NFR BLD AUTO: 9 % (ref 4–12)
NEUTROPHILS # BLD AUTO: 3.62 THOUSANDS/ΜL (ref 1.85–7.62)
NEUTS SEG NFR BLD AUTO: 62 % (ref 43–75)
NITRITE UR QL STRIP: NEGATIVE
NON-SQ EPI CELLS URNS QL MICRO: ABNORMAL /HPF
NRBC BLD AUTO-RTO: 0 /100 WBCS
NT-PROBNP SERPL-MCNC: 2154 PG/ML
PH UR STRIP.AUTO: 5.5 [PH] (ref 4.5–8)
PLATELET # BLD AUTO: 183 THOUSANDS/UL (ref 149–390)
PMV BLD AUTO: 10.7 FL (ref 8.9–12.7)
POTASSIUM SERPL-SCNC: 5 MMOL/L (ref 3.5–5.3)
PROT SERPL-MCNC: 6.6 G/DL (ref 6.4–8.2)
PROT UR STRIP-MCNC: NEGATIVE MG/DL
QRS AXIS: -12 DEGREES
QRSD INTERVAL: 62 MS
QT INTERVAL: 398 MS
QTC INTERVAL: 410 MS
RBC # BLD AUTO: 4.29 MILLION/UL (ref 3.81–5.12)
RBC #/AREA URNS AUTO: ABNORMAL /HPF
SODIUM SERPL-SCNC: 140 MMOL/L (ref 136–145)
SP GR UR STRIP.AUTO: 1.01 (ref 1–1.03)
T WAVE AXIS: 93 DEGREES
TROPONIN I SERPL-MCNC: 0.13 NG/ML
TSH SERPL DL<=0.05 MIU/L-ACNC: 0.59 UIU/ML (ref 0.36–3.74)
UROBILINOGEN UR QL STRIP.AUTO: 0.2 E.U./DL
VENTRICULAR RATE: 64 BPM
WBC # BLD AUTO: 5.93 THOUSAND/UL (ref 4.31–10.16)
WBC #/AREA URNS AUTO: ABNORMAL /HPF

## 2019-01-08 PROCEDURE — 84484 ASSAY OF TROPONIN QUANT: CPT | Performed by: PHYSICIAN ASSISTANT

## 2019-01-08 PROCEDURE — 93010 ELECTROCARDIOGRAM REPORT: CPT | Performed by: INTERNAL MEDICINE

## 2019-01-08 PROCEDURE — 96360 HYDRATION IV INFUSION INIT: CPT

## 2019-01-08 PROCEDURE — 87081 CULTURE SCREEN ONLY: CPT | Performed by: PHYSICIAN ASSISTANT

## 2019-01-08 PROCEDURE — 36415 COLL VENOUS BLD VENIPUNCTURE: CPT

## 2019-01-08 PROCEDURE — 85025 COMPLETE CBC W/AUTO DIFF WBC: CPT | Performed by: EMERGENCY MEDICINE

## 2019-01-08 PROCEDURE — 87631 RESP VIRUS 3-5 TARGETS: CPT | Performed by: INTERNAL MEDICINE

## 2019-01-08 PROCEDURE — 99285 EMERGENCY DEPT VISIT HI MDM: CPT

## 2019-01-08 PROCEDURE — 99220 PR INITIAL OBSERVATION CARE/DAY 70 MINUTES: CPT | Performed by: PHYSICIAN ASSISTANT

## 2019-01-08 PROCEDURE — 84484 ASSAY OF TROPONIN QUANT: CPT | Performed by: EMERGENCY MEDICINE

## 2019-01-08 PROCEDURE — 93005 ELECTROCARDIOGRAM TRACING: CPT

## 2019-01-08 PROCEDURE — 83880 ASSAY OF NATRIURETIC PEPTIDE: CPT | Performed by: EMERGENCY MEDICINE

## 2019-01-08 PROCEDURE — 81001 URINALYSIS AUTO W/SCOPE: CPT

## 2019-01-08 PROCEDURE — 80053 COMPREHEN METABOLIC PANEL: CPT | Performed by: EMERGENCY MEDICINE

## 2019-01-08 PROCEDURE — 71046 X-RAY EXAM CHEST 2 VIEWS: CPT

## 2019-01-08 PROCEDURE — 84443 ASSAY THYROID STIM HORMONE: CPT | Performed by: PHYSICIAN ASSISTANT

## 2019-01-08 RX ORDER — NITROGLYCERIN 0.4 MG/1
0.4 TABLET SUBLINGUAL
Status: DISCONTINUED | OUTPATIENT
Start: 2019-01-08 | End: 2019-01-10 | Stop reason: HOSPADM

## 2019-01-08 RX ORDER — MIRTAZAPINE 30 MG/1
30 TABLET, FILM COATED ORAL
Status: DISCONTINUED | OUTPATIENT
Start: 2019-01-08 | End: 2019-01-10 | Stop reason: HOSPADM

## 2019-01-08 RX ORDER — METOPROLOL TARTRATE 5 MG/5ML
2.5 INJECTION INTRAVENOUS ONCE
Status: COMPLETED | OUTPATIENT
Start: 2019-01-08 | End: 2019-01-08

## 2019-01-08 RX ORDER — SENNOSIDES 8.6 MG
1 TABLET ORAL DAILY
Status: DISCONTINUED | OUTPATIENT
Start: 2019-01-09 | End: 2019-01-10 | Stop reason: HOSPADM

## 2019-01-08 RX ORDER — CALCIUM CARBONATE 200(500)MG
1000 TABLET,CHEWABLE ORAL DAILY PRN
Status: DISCONTINUED | OUTPATIENT
Start: 2019-01-08 | End: 2019-01-10 | Stop reason: HOSPADM

## 2019-01-08 RX ORDER — ONDANSETRON 2 MG/ML
4 INJECTION INTRAMUSCULAR; INTRAVENOUS EVERY 6 HOURS PRN
Status: DISCONTINUED | OUTPATIENT
Start: 2019-01-08 | End: 2019-01-10 | Stop reason: HOSPADM

## 2019-01-08 RX ORDER — HEPARIN SODIUM 5000 [USP'U]/ML
5000 INJECTION, SOLUTION INTRAVENOUS; SUBCUTANEOUS EVERY 8 HOURS SCHEDULED
Status: DISCONTINUED | OUTPATIENT
Start: 2019-01-08 | End: 2019-01-10 | Stop reason: HOSPADM

## 2019-01-08 RX ORDER — ACETAMINOPHEN 325 MG/1
650 TABLET ORAL EVERY 6 HOURS PRN
Status: DISCONTINUED | OUTPATIENT
Start: 2019-01-08 | End: 2019-01-10 | Stop reason: HOSPADM

## 2019-01-08 RX ORDER — ASPIRIN 81 MG/1
81 TABLET ORAL DAILY
Status: DISCONTINUED | OUTPATIENT
Start: 2019-01-09 | End: 2019-01-10 | Stop reason: HOSPADM

## 2019-01-08 RX ORDER — ASPIRIN 81 MG/1
324 TABLET, CHEWABLE ORAL ONCE
Status: COMPLETED | OUTPATIENT
Start: 2019-01-08 | End: 2019-01-08

## 2019-01-08 RX ORDER — LISINOPRIL 10 MG/1
10 TABLET ORAL DAILY
Status: DISCONTINUED | OUTPATIENT
Start: 2019-01-09 | End: 2019-01-10 | Stop reason: HOSPADM

## 2019-01-08 RX ORDER — HYDRALAZINE HYDROCHLORIDE 20 MG/ML
5 INJECTION INTRAMUSCULAR; INTRAVENOUS EVERY 6 HOURS PRN
Status: DISCONTINUED | OUTPATIENT
Start: 2019-01-08 | End: 2019-01-08

## 2019-01-08 RX ORDER — DOCUSATE SODIUM 100 MG/1
100 CAPSULE, LIQUID FILLED ORAL 2 TIMES DAILY
Status: DISCONTINUED | OUTPATIENT
Start: 2019-01-08 | End: 2019-01-10 | Stop reason: HOSPADM

## 2019-01-08 RX ADMIN — HEPARIN SODIUM 5000 UNITS: 5000 INJECTION INTRAVENOUS; SUBCUTANEOUS at 21:09

## 2019-01-08 RX ADMIN — DOCUSATE SODIUM 100 MG: 100 CAPSULE, LIQUID FILLED ORAL at 21:09

## 2019-01-08 RX ADMIN — METOPROLOL TARTRATE 2.5 MG: 5 INJECTION, SOLUTION INTRAVENOUS at 20:30

## 2019-01-08 RX ADMIN — SODIUM CHLORIDE 1000 ML: 0.9 INJECTION, SOLUTION INTRAVENOUS at 15:47

## 2019-01-08 RX ADMIN — ASPIRIN 81 MG 324 MG: 81 TABLET ORAL at 15:46

## 2019-01-08 RX ADMIN — MIRTAZAPINE 30 MG: 30 TABLET ORAL at 21:09

## 2019-01-08 RX ADMIN — NITROGLYCERIN 1 INCH: 20 OINTMENT TOPICAL at 21:09

## 2019-01-08 NOTE — ED ATTENDING ATTESTATION
Ligia Medrano MD, saw and evaluated the patient  I have discussed the patient with the resident/non-physician practitioner and agree with the resident's/non-physician practitioner's findings, Plan of Care, and MDM as documented in the resident's/non-physician practitioner's note, except where noted  All available labs and Radiology studies were reviewed  At this point I agree with the current assessment done in the Emergency Department  I have conducted an independent evaluation of this patient a history and physical is as follows:    Generalized weakness x 2 days, lives independently  nonfocal exam  Plan - cardiac metabolic septic w/u including CT head for weakness, anticipate admission given pt does not appear safe for d/c home at this time       Critical Care Time  CritCare Time    Procedures

## 2019-01-08 NOTE — ASSESSMENT & PLAN NOTE
· Patient without cardiac complaints  · Possible type 2 from elevated BP  · Will trend troponins   Obtain echo  · If troponins markedly elevate or abnormalities on echo then would consult cardiology  · Hold on heparin drip unless troponins trend up signficantly

## 2019-01-08 NOTE — H&P
H&P- Braydon Arreola 10/30/1923, 80 y o  female MRN: [id number]    Unit/Bed#: ED 03 Encounter: [id number]    Primary Care Provider: Deandre Lyon MD   Date and time admitted to hospital: 1/8/2019  2:30 PM    * NSTEMI (non-ST elevated myocardial infarction) [facility])   Assessment & Plan    · Patient without cardiac complaints  · Possible type 2 from elevated BP  · Will trend troponins  Obtain echo  · If troponins markedly elevate or abnormalities on echo then would consult cardiology  · Hold on heparin drip unless troponins trend up signficantly     Generalized weakness   Assessment & Plan    · Probably multifactorial given advanced age, deconditioning, depression, possible cardiac event  · PT/OT evals  · Check TSH     Stage 3 chronic kidney disease ([facility] [address] )   Assessment & Plan    · Creatinine at baseline  Monitor     Depression with anxiety   Assessment & Plan    · Depression increased recently given her late 's birthday was three days ago  · Continue Remeron  Consider geriatrics consult for depression if needed  Essential hypertension   Assessment & Plan    · Unclear if she was taking her lisinopril  · Monitor  Adjust if persistently hypertensive       VTE Prophylaxis: Enoxaparin (Lovenox)  / sequential compression device   Code Status:  DNR DNI  POLST: There is no POLST form on file for this patient (pre-hospital)  Discussion with family:  Updated daughter    Anticipated Length of Stay:  Patient will be admitted on an Observation basis with an anticipated length of stay of  > 2 midnights  Justification for Hospital Stay:  Weakness and elevated troponin    Total Time for Visit, including Counseling / Coordination of Care: 45 minutes  Greater than 50% of this total time spent on direct patient counseling and coordination of care      Chief Complaint:   Weakness    History of Present Illness:    Braydon Arreola is a 80 y o  female with history of hypertension, CKD, and hypothyroidism who presents from assisted living facility with generalized weakness over the last 2 weeks  She reports fatigue and has little interest in eating or getting out of bed  She has been progressively weak and this morning told staff she was too weak to get out of bed and therefore she came to the emergency room for evaluation  She feels she may be more depressed than normal  She uses a walker to ambulate  The patient's daughter tells me she has been less interested in participating actively in activities at her assisted living facility  She normally goes down the stairs to meals 3 times a day but has often been skipping meals  She does note that three days ago was the birthday of the patient's , who has   In the emergency room, patient was noted to have troponin of 0 13 and elevated BNP  Patient denies chest pain or SOB both at rest or with exertion  Review of Systems:    Review of Systems   Constitutional: Positive for fatigue  HENT: Negative  Eyes: Negative  Respiratory: Negative  Cardiovascular: Negative  Gastrointestinal: Negative  Endocrine: Negative  Genitourinary: Negative  Musculoskeletal: Negative  Skin: Negative  Allergic/Immunologic: Negative  Neurological: Positive for weakness  Hematological: Negative  Psychiatric/Behavioral: Positive for dysphoric mood  Past Medical and Surgical History:     Past Medical History:   Diagnosis Date    Depression with anxiety     Hypertension     Hypothyroidism        Past Surgical History:   Procedure Laterality Date    CATARACT EXTRACTION      CHOLECYSTECTOMY         Meds/Allergies:    Prior to Admission medications    Medication Sig Start Date End Date Taking? Authorizing Provider   aspirin (ASPIR-81) 81 mg EC tablet Take 1 tablet by mouth daily 13  Yes Historical Provider, MD   levothyroxine 75 mcg tablet TAKE 1 TABLET DAILY   18  Yes Karolyn Martini MD   lisinopril (ZESTRIL) 10 mg tablet TAKE 1 TABLET DAILY AS DIRECTED 11/12/18  Yes Christopher Patel MD   mirtazapine (REMERON) 30 mg tablet TAKE 1 TABLET DAILY AT BEDTIME 11/21/18  Yes Christopher Patel MD   multivitamin [facility]) TABS Take 1 tablet by mouth daily   Yes Historical Provider, MD   carbamide peroxide (DEBROX) 6 5 % otic solution Administer 5 drops into both ears 2 (two) times a day for 4 days 10/10/18 10/14/18  Christopher Patel MD     I have reviewed home medications using allscripts  Allergies: Allergies   Allergen Reactions    Hydrochlorothiazide        Social History:     Marital Status:    Occupation: None  Patient Pre-hospital Living Situation: Assisted living  Patient Pre-hospital Level of Mobility: Uses a walker  Patient Pre-hospital Diet Restrictions: None  Substance Use History:   History   Alcohol Use No     History   Smoking Status    Former Smoker   Smokeless Tobacco    Never Used     History   Drug Use No       Family History:    non-contributory    Physical Exam:     Vitals:   Blood Pressure: (!) 179/81 (01/08/19 1700)  Pulse: 68 (01/08/19 1700)  Temperature: (!) 97 3 °F (36 3 °C) (01/08/19 1439)  Temp Source: Oral (01/08/19 1439)  Respirations: 22 (01/08/19 1700)  Weight - Scale: 61 7 kg (136 lb 0 4 oz) (01/08/19 1439)  SpO2: 97 % (01/08/19 1700)    Physical Exam   Constitutional: No distress  HENT:   Head: Normocephalic and atraumatic  Eyes: No scleral icterus  Cardiovascular: Normal rate, regular rhythm and normal heart sounds  Pulmonary/Chest: Effort normal and breath sounds normal  No respiratory distress  She has no wheezes  She has no rales  Abdominal: Soft  Bowel sounds are normal  She exhibits no distension  There is no tenderness  There is no rebound  Musculoskeletal:   B/L edema   Neurological: She is alert  Able to lift both legs off the bed   strength 5/5  No focal deficits noted   Skin: She is not diaphoretic  Dry skin   Psychiatric: Thought content normal            Additional Data:     Lab Results:  I have personally reviewed pertinent reports  Results from last 7 days  Lab Units 01/08/19  1442   WBC Thousand/uL 5 93   HEMOGLOBIN g/dL 13 2   HEMATOCRIT % 42 1   PLATELETS Thousands/uL 183   NEUTROS PCT % 62   LYMPHS PCT % 26   MONOS PCT % 9   EOS PCT % 3       Results from last 7 days  Lab Units 01/08/19  1442   SODIUM mmol/L 140   POTASSIUM mmol/L 5 0   CHLORIDE mmol/L 111*   CO2 mmol/L 25   BUN mg/dL 27*   CREATININE mg/dL 1 23   ANION GAP mmol/L 4   CALCIUM mg/dL 10 1   ALBUMIN g/dL 3 4*   TOTAL BILIRUBIN mg/dL 0 43   ALK PHOS U/L 69   ALT U/L 15   AST U/L 22   GLUCOSE RANDOM mg/dL 98                       Imaging: I have personally reviewed pertinent reports  X-ray chest 2 views    (Results Pending)       EKG, Pathology, and Other Studies Reviewed on Admission:   · EKG: Normal sinus  Nonspecific ST changes    Allscripts / Epic Records Reviewed: Yes     ** Please Note: This note has been constructed using a voice recognition system   **

## 2019-01-08 NOTE — ED PROVIDER NOTES
History  Chief Complaint   Patient presents with    Weakness - Generalized     patient complains of increase tiredness  weakness and not feeling well  Denies any chest pain/SOB  Decrease appetite and did not take her medications today  HPI    60-year-old female pmhx hypertension, hypothyroidism presents for evaluation of weakness  Patient is presenting from [facility] where she resides as assisted living but administers her own medications  Patient says for last couple weeks she has not been feeling like erself, no strength to get out of bed  Patient says she has not been eating or drinking as she normally does  Patient admits not taking any of her medications today  Denies any trauma  Denies any fever, chills, chest pain, shortness of breath, nausea, vomiting, abdominal pain, diarrhea, or dysuria  Prior to Admission Medications   Prescriptions Last Dose Informant Patient Reported? Taking?   aspirin (ASPIR-81) 81 mg EC tablet   Yes Yes   Sig: Take 1 tablet by mouth daily   carbamide peroxide (DEBROX) 6 5 % otic solution   No No   Sig: Administer 5 drops into both ears 2 (two) times a day for 4 days   levothyroxine 75 mcg tablet   No Yes   Sig: TAKE 1 TABLET DAILY  lisinopril (ZESTRIL) 10 mg tablet   No Yes   Sig: TAKE 1 TABLET DAILY AS DIRECTED   mirtazapine (REMERON) 30 mg tablet   No Yes   Sig: TAKE 1 TABLET DAILY AT BEDTIME   multivitamin (THERAGRAN) TABS   Yes Yes   Sig: Take 1 tablet by mouth daily      Facility-Administered Medications: None       Past Medical History:   Diagnosis Date    Depression with anxiety     Hypertension     Hypothyroidism        Past Surgical History:   Procedure Laterality Date    CATARACT EXTRACTION      CHOLECYSTECTOMY         Family History   Problem Relation Age of Onset    No Known Problems Mother     No Known Problems Father      I have reviewed and agree with the history as documented      Social History   Substance Use Topics    Smoking status: Former Smoker    Smokeless tobacco: Never Used    Alcohol use No        Review of Systems   Constitutional: Positive for activity change and appetite change  Negative for chills, diaphoresis, fatigue and fever  HENT: Negative for congestion, rhinorrhea and sore throat  Eyes: Negative for photophobia and visual disturbance  Respiratory: Negative for cough, chest tightness and shortness of breath  Cardiovascular: Negative for chest pain and palpitations  Gastrointestinal: Negative for abdominal pain, blood in stool, constipation, diarrhea, nausea and vomiting  Genitourinary: Positive for decreased urine volume  Negative for dysuria, frequency and hematuria  Musculoskeletal: Negative for back pain, gait problem, myalgias, neck pain and neck stiffness  Skin: Negative for pallor and rash  Neurological: Positive for weakness  Negative for syncope, light-headedness, numbness and headaches  Hematological: Negative for adenopathy  Does not bruise/bleed easily  All other systems reviewed and are negative  Physical Exam  ED Triage Vitals [01/08/19 1439]   Temperature Pulse Respirations Blood Pressure SpO2   (!) 97 3 °F (36 3 °C) 92 18 (!) 209/89 98 %      Temp Source Heart Rate Source Patient Position - Orthostatic VS BP Location FiO2 (%)   Oral Monitor Lying Left arm --      Pain Score       3           Orthostatic Vital Signs  Vitals:    01/08/19 2116 01/08/19 2310 01/09/19 0300 01/09/19 0759   BP: 165/77 (!) 182/88 165/81 (!) 175/81   Pulse:  81 72 80   Patient Position - Orthostatic VS: Lying Lying Lying        Physical Exam   Constitutional: She is oriented to person, place, and time  No distress  Patient is alert and oriented, appears weak but nontoxic, in no acute distress   HENT:   Head: Normocephalic and atraumatic  Mouth/Throat: Oropharynx is clear and moist  No oropharyngeal exudate  Eyes: Pupils are equal, round, and reactive to light   Conjunctivae and EOM are normal  Neck: Normal range of motion  Neck supple  Cardiovascular: Normal rate, regular rhythm, normal heart sounds and intact distal pulses  Pulmonary/Chest: Effort normal and breath sounds normal  No respiratory distress  Abdominal: Soft  Bowel sounds are normal  She exhibits no distension  There is tenderness  Suprapubic tenderness on palpation   Musculoskeletal: She exhibits edema  2+ pedal edema bilaterally   Lymphadenopathy:     She has no cervical adenopathy  Neurological: She is alert and oriented to person, place, and time  No facial asymmetry noted, CN 2-12 intact  Limited ROM, appropriate for age    Upper extremities muscle strength 5/5, lower extremities 4/5 most likely due to venous stasis, DTRs normal, sensation intact throughout   Skin: Skin is warm and dry  Capillary refill takes less than 2 seconds  No rash noted  She is not diaphoretic  No erythema  No pallor  Psychiatric: She has a normal mood and affect  Her behavior is normal  Judgment and thought content normal    Nursing note and vitals reviewed        ED Medications  Medications   aspirin (ECOTRIN LOW STRENGTH) EC tablet 81 mg (81 mg Oral Given 1/9/19 0804)   levothyroxine tablet 75 mcg (75 mcg Oral Given 1/9/19 0528)   lisinopril (ZESTRIL) tablet 10 mg (10 mg Oral Given 1/9/19 0804)   mirtazapine (REMERON) tablet 30 mg (30 mg Oral Given 1/8/19 2109)   senna (SENOKOT) tablet 8 6 mg (8 6 mg Oral Given 1/9/19 0804)   docusate sodium (COLACE) capsule 100 mg (100 mg Oral Given 1/9/19 0804)   ondansetron (ZOFRAN) injection 4 mg (not administered)   calcium carbonate (TUMS) chewable tablet 1,000 mg (not administered)   heparin (porcine) subcutaneous injection 5,000 Units (5,000 Units Subcutaneous Given 1/9/19 0529)   acetaminophen (TYLENOL) tablet 650 mg (not administered)   nitroglycerin (NITROSTAT) SL tablet 0 4 mg (not administered)   sodium chloride 0 9 % bolus 1,000 mL (0 mL Intravenous Stopped 1/8/19 1701)   aspirin chewable tablet 324 mg (324 mg Oral Given 1/8/19 1546)   nitroglycerin (NITRO-BID) 2 % TD ointment 1 inch (1 inch Topical Given 1/8/19 2109)   metoprolol (LOPRESSOR) injection 2 5 mg (2 5 mg Intravenous Given 1/8/19 2030)   metoprolol (LOPRESSOR) injection 2 5 mg (2 5 mg Intravenous Given 1/9/19 0151)       Diagnostic Studies  Results Reviewed     Procedure Component Value Units Date/Time    Influenza A/B and RSV by PCR [[id number]]  (Normal) Collected:  01/08/19 1833    Lab Status:  Final result Specimen:  Nasopharyngeal from Nasopharyngeal Swab Updated:  01/09/19 0529     INFLU A PCR None Detected     INFLU B PCR None Detected     RSV PCR None Detected    Troponin I [[id number]]  (Abnormal) Collected:  01/08/19 1941    Lab Status:  Final result Specimen:  Blood from Arm, Left Updated:  01/08/19 2011     Troponin I 0 13 (H) ng/mL     Urine Microscopic [[id number]]  (Abnormal) Collected:  01/08/19 1820    Lab Status:  Final result Specimen:  Urine from Urine, Other Updated:  01/08/19 1926     RBC, UA None Seen /hpf      WBC, UA 2-4 (A) /hpf      Epithelial Cells Occasional /hpf      Bacteria, UA Occasional /hpf     POCT urinalysis dipstick [[id number]]  (Normal) Resulted:  01/08/19 1819    Lab Status:  Final result Specimen:  Urine Updated:  01/08/19 1820     Color, UA straw    ED Urine Macroscopic [[id number]]  (Abnormal) Collected:  01/08/19 1820    Lab Status:  Final result Specimen:  Urine Updated:  01/08/19 1819     Color, UA Yellow     Clarity, UA Clear     pH, UA 5 5     Leukocytes, UA Trace (A)     Nitrite, UA Negative     Protein, UA Negative mg/dl      Glucose, UA Negative mg/dl      Ketones, UA Negative mg/dl      Urobilinogen, UA 0 2 E U /dl      Bilirubin, UA Negative     Blood, UA Trace (A)     Specific [place], UA 1 015    Narrative:       CLINITEK RESULT    Troponin I [[id number]]  (Abnormal) Collected:  01/08/19 1743    Lab Status:  Final result Specimen:  Blood from Arm, Left Updated:  01/08/19 1815     Troponin I 0 13 (H) ng/mL     TSH, 3rd generation [[id number]]  (Normal) Collected:  01/08/19 1442    Lab Status:  Final result Specimen:  Blood from Arm, Left Updated:  01/08/19 1753     TSH 3RD GENERATON 0 594 uIU/mL     Narrative:         Patients undergoing fluorescein dye angiography may retain small amounts of fluorescein in the body for 48-72 hours post procedure  Samples containing fluorescein can produce falsely depressed TSH values  If the patient had this procedure,a specimen should be resubmitted post fluorescein clearance  The recommended reference ranges for TSH during pregnancy are as follows:  First trimester 0 1 to 2 5 uIU/mL  Second trimester  0 2 to 3 0 uIU/mL  Third trimester 0 3 to 3 0 uIU/m      ED Urine Macroscopic [[id number]] Collected:  01/08/19 1605    Lab Status:  Edited Result - FINAL Specimen:  Urine Updated:  01/08/19 1622     Color, UA --     Clarity, UA --     pH, UA --     Leukocytes, UA --     Nitrite, UA --     Protein, UA -- mg/dl      Glucose, UA -- mg/dl      Ketones, UA -- mg/dl      Urobilinogen, UA -- E U /dl      Bilirubin, UA --     Blood, UA --     Specific [place], UA --    Narrative:       CLINITEK RESULT,wrong  Patient as per aiHit      Urine Microscopic [[id number]] Collected:  01/08/19 1605    Lab Status:  No result Specimen:  Urine from Urine, Other     BNP [[id number]]  (Abnormal) Collected:  01/08/19 1442    Lab Status:  Final result Specimen:  Blood from Arm, Left Updated:  01/08/19 1600     NT-proBNP 2,154 (H) pg/mL     Troponin I [[id number]]  (Abnormal) Collected:  01/08/19 1442    Lab Status:  Final result Specimen:  Blood from Arm, Left Updated:  01/08/19 1515     Troponin I 0 13 (H) ng/mL     Comprehensive metabolic panel [[id number]]  (Abnormal) Collected:  01/08/19 1442    Lab Status:  Final result Specimen:  Blood from Arm, Left Updated:  01/08/19 1515     Sodium 140 mmol/L      Potassium 5 0 mmol/L      Chloride 111 (H) mmol/L      CO2 25 mmol/L      ANION GAP 4 mmol/L      BUN 27 (H) mg/dL      Creatinine 1 23 mg/dL      Glucose 98 mg/dL      Calcium 10 1 mg/dL      AST 22 U/L      ALT 15 U/L      Alkaline Phosphatase 69 U/L      Total Protein 6 6 g/dL      Albumin 3 4 (L) g/dL      Total Bilirubin 0 43 mg/dL      eGFR 37 ml/min/1 73sq m     Narrative:         National Kidney Disease Education Program recommendations are as follows:  GFR calculation is accurate only with a steady state creatinine  Chronic Kidney disease less than 60 ml/min/1 73 sq  meters  Kidney failure less than 15 ml/min/1 73 sq  meters  CBC and differential [[id number]] Collected:  01/08/19 1442    Lab Status:  Final result Specimen:  Blood from Arm, Left Updated:  01/08/19 1457     WBC 5 93 Thousand/uL      RBC 4 29 Million/uL      Hemoglobin 13 2 g/dL      Hematocrit 42 1 %      MCV 98 fL      MCH 30 8 pg      MCHC 31 4 g/dL      RDW 13 3 %      MPV 10 7 fL      Platelets 716 Thousands/uL      nRBC 0 /100 WBCs      Neutrophils Relative 62 %      Immat GRANS % 0 %      Lymphocytes Relative 26 %      Monocytes Relative 9 %      Eosinophils Relative 3 %      Basophils Relative 0 %      Neutrophils Absolute 3 62 Thousands/µL      Immature Grans Absolute 0 02 Thousand/uL      Lymphocytes Absolute 1 56 Thousands/µL      Monocytes Absolute 0 56 Thousand/µL      Eosinophils Absolute 0 15 Thousand/µL      Basophils Absolute 0 02 Thousands/µL                  X-ray chest 2 views   Final Result by Dina Centeno MD (01/08 2223)      No acute cardiopulmonary disease              Workstation performed: [id number]               Procedures  ECG 12 Lead Documentation  Date/Time: 1/8/2019 3:25 PM  Performed by: Radha Guthrie  Authorized by: Radha Guthrie     Indications / Diagnosis:  Weakness  ECG reviewed by me, the ED Provider: yes    Patient location:  ED and bedside  Previous ECG:     Previous ECG:  Compared to current    Similarity:  Changes noted    Comparison to cardiac monitor: Yes Interpretation:     Interpretation: non-specific    Quality:     Tracing quality:  Limited by artifact  Rate:     ECG rate:  64    ECG rate assessment: normal    Rhythm:     Rhythm: sinus rhythm    Ectopy:     Ectopy: none    QRS:     QRS axis:  Left    QRS intervals:  Normal  Conduction:     Conduction: normal    ST segments:     ST segments:  Normal  T waves:     T waves: normal            Phone Consults  ED Phone Contact    ED Course  ED Course as of Jan 09 [address] [place]   Tue Jan 08, 2019   1518 Troponin I: (!) 0 13   1602 NT-proBNP: (!) 2,154           Identification of Seniors at Risk      Most Recent Value   (ISAR) Identification of Seniors at Risk   Before the illness or injury that brought you to the Emergency, did you need someone to help you on a regular basis? 1 Filed at: 01/08/2019 1440   In the last 24 hours, have you needed more help than usual?  1 Filed at: 01/08/2019 1440   Have you been hospitalized for one or more nights during the past 6 months? 0 Filed at: 01/08/2019 1440   In general, do you see well?  0 Filed at: 01/08/2019 1440   In general, do you have serious problems with your memory? 0 Filed at: 01/08/2019 1440   Do you take more than three different medications every day?   1 Filed at: 01/08/2019 1440   ISAR Score  3 Filed at: 01/08/2019 1440                          MDM  Number of Diagnoses or Management Options  Elevated troponin:   Generalized weakness:   NSTEMI (non-ST elevated myocardial infarction) [facility]):   Diagnosis management comments: Impression: 81yo female presents for evaluation of generalized weakness  Ddx: ACS, UTI, CHF, electrolyte disturbance  Plan: cardiac, BNP, urinalysis     CritCare Time    Disposition  Final diagnoses:   NSTEMI (non-ST elevated myocardial infarction) ([address] )   Generalized weakness   Elevated troponin     Time reflects when diagnosis was documented in both MDM as applicable and the Disposition within this note     Time User Action Codes Description Comment 1/8/2019  4:22 PM Elva Murray Add [I21 4] NSTEMI (non-ST elevated myocardial infarction) ([facility] [address] )     1/8/2019  4:22 PM Elva Murray Add [R53 1] Generalized weakness     1/8/2019  4:25 PM Elva Murray Add [R74 8] Elevated troponin     1/8/2019  6:30 PM Annabelle Perez Add [F41 8] Depression with anxiety     1/8/2019  6:30 PM Barbara Kevin [F41 8] Depression with anxiety       ED Disposition     ED Disposition Condition Comment    Admit  Case was discussed with Dr Hank Mcneill and the patient's admission status was agreed to be Admission Status: observation status to the service of Dr Hank Mcneill   Follow-up Information    None         Current Discharge Medication List      CONTINUE these medications which have NOT CHANGED    Details   aspirin (ASPIR-81) 81 mg EC tablet Take 1 tablet by mouth daily      levothyroxine 75 mcg tablet TAKE 1 TABLET DAILY  Qty: 90 tablet, Refills: 0    Associated Diagnoses: Acquired hypothyroidism      lisinopril (ZESTRIL) 10 mg tablet TAKE 1 TABLET DAILY AS DIRECTED  Qty: 90 tablet, Refills: 0    Associated Diagnoses: Essential hypertension      mirtazapine (REMERON) 30 mg tablet TAKE 1 TABLET DAILY AT BEDTIME  Qty: 90 tablet, Refills: 0    Associated Diagnoses: Depression, unspecified depression type      multivitamin (THERAGRAN) TABS Take 1 tablet by mouth daily      carbamide peroxide (DEBROX) 6 5 % otic solution Administer 5 drops into both ears 2 (two) times a day for 4 days  Qty: 15 mL, Refills: 0    Associated Diagnoses: Cerumen debris on tympanic membrane of both ears           No discharge procedures on file  ED Provider  Attending physically available and evaluated Yina Blount I managed the patient along with the ED Attending      Electronically Signed by         Su Jesus MD  01/09/19 1885

## 2019-01-08 NOTE — ASSESSMENT & PLAN NOTE
· Probably multifactorial given advanced age, deconditioning, depression, possible cardiac event  · PT/OT evals  · Check TSH

## 2019-01-08 NOTE — ASSESSMENT & PLAN NOTE
· Depression increased recently given her late 's birthday was three days ago  · Continue Lesli  Consider geriatrics consult for depression if needed

## 2019-01-09 ENCOUNTER — APPOINTMENT (OUTPATIENT)
Dept: NON INVASIVE DIAGNOSTICS | Facility: HOSPITAL | Age: 84
End: 2019-01-09
Payer: COMMERCIAL

## 2019-01-09 PROBLEM — F32.A DEPRESSION: Status: ACTIVE | Noted: 2019-01-09

## 2019-01-09 PROBLEM — R26.2 AMBULATORY DYSFUNCTION: Status: ACTIVE | Noted: 2019-01-09

## 2019-01-09 PROBLEM — R53.81 PHYSICAL DECONDITIONING: Status: ACTIVE | Noted: 2019-01-09

## 2019-01-09 PROBLEM — R68.89 FORGETFULNESS: Status: ACTIVE | Noted: 2019-01-09

## 2019-01-09 LAB
ANION GAP SERPL CALCULATED.3IONS-SCNC: 6 MMOL/L (ref 4–13)
ATRIAL RATE: 66 BPM
BUN SERPL-MCNC: 25 MG/DL (ref 5–25)
CALCIUM SERPL-MCNC: 9.7 MG/DL (ref 8.3–10.1)
CHLORIDE SERPL-SCNC: 111 MMOL/L (ref 100–108)
CO2 SERPL-SCNC: 23 MMOL/L (ref 21–32)
CREAT SERPL-MCNC: 1.09 MG/DL (ref 0.6–1.3)
ERYTHROCYTE [DISTWIDTH] IN BLOOD BY AUTOMATED COUNT: 13.2 % (ref 11.6–15.1)
FLUAV AG SPEC QL: NORMAL
FLUBV AG SPEC QL: NORMAL
GFR SERPL CREATININE-BSD FRML MDRD: 43 ML/MIN/1.73SQ M
GLUCOSE SERPL-MCNC: 65 MG/DL (ref 65–140)
HCT VFR BLD AUTO: 38.8 % (ref 34.8–46.1)
HGB BLD-MCNC: 12.3 G/DL (ref 11.5–15.4)
MCH RBC QN AUTO: 30.9 PG (ref 26.8–34.3)
MCHC RBC AUTO-ENTMCNC: 31.7 G/DL (ref 31.4–37.4)
MCV RBC AUTO: 98 FL (ref 82–98)
P AXIS: 80 DEGREES
PLATELET # BLD AUTO: 162 THOUSANDS/UL (ref 149–390)
PMV BLD AUTO: 11.2 FL (ref 8.9–12.7)
POTASSIUM SERPL-SCNC: 4.9 MMOL/L (ref 3.5–5.3)
PR INTERVAL: 210 MS
QRS AXIS: -9 DEGREES
QRSD INTERVAL: 66 MS
QT INTERVAL: 404 MS
QTC INTERVAL: 423 MS
RBC # BLD AUTO: 3.98 MILLION/UL (ref 3.81–5.12)
RSV B RNA SPEC QL NAA+PROBE: NORMAL
SODIUM SERPL-SCNC: 140 MMOL/L (ref 136–145)
T WAVE AXIS: 52 DEGREES
VENTRICULAR RATE: 66 BPM
WBC # BLD AUTO: 4.56 THOUSAND/UL (ref 4.31–10.16)

## 2019-01-09 PROCEDURE — G8988 SELF CARE GOAL STATUS: HCPCS

## 2019-01-09 PROCEDURE — 97166 OT EVAL MOD COMPLEX 45 MIN: CPT

## 2019-01-09 PROCEDURE — 85027 COMPLETE CBC AUTOMATED: CPT | Performed by: PHYSICIAN ASSISTANT

## 2019-01-09 PROCEDURE — 99204 OFFICE O/P NEW MOD 45 MIN: CPT | Performed by: INTERNAL MEDICINE

## 2019-01-09 PROCEDURE — 80048 BASIC METABOLIC PNL TOTAL CA: CPT | Performed by: PHYSICIAN ASSISTANT

## 2019-01-09 PROCEDURE — 99225 PR SBSQ OBSERVATION CARE/DAY 25 MINUTES: CPT | Performed by: NURSE PRACTITIONER

## 2019-01-09 PROCEDURE — G8978 MOBILITY CURRENT STATUS: HCPCS

## 2019-01-09 PROCEDURE — 93010 ELECTROCARDIOGRAM REPORT: CPT | Performed by: INTERNAL MEDICINE

## 2019-01-09 PROCEDURE — G8987 SELF CARE CURRENT STATUS: HCPCS

## 2019-01-09 PROCEDURE — 97163 PT EVAL HIGH COMPLEX 45 MIN: CPT

## 2019-01-09 PROCEDURE — 93306 TTE W/DOPPLER COMPLETE: CPT

## 2019-01-09 PROCEDURE — 93306 TTE W/DOPPLER COMPLETE: CPT | Performed by: INTERNAL MEDICINE

## 2019-01-09 PROCEDURE — G8979 MOBILITY GOAL STATUS: HCPCS

## 2019-01-09 RX ORDER — METOPROLOL TARTRATE 5 MG/5ML
2.5 INJECTION INTRAVENOUS ONCE
Status: COMPLETED | OUTPATIENT
Start: 2019-01-09 | End: 2019-01-09

## 2019-01-09 RX ORDER — SERTRALINE HYDROCHLORIDE 25 MG/1
25 TABLET, FILM COATED ORAL DAILY
Status: DISCONTINUED | OUTPATIENT
Start: 2019-01-09 | End: 2019-01-09

## 2019-01-09 RX ADMIN — HEPARIN SODIUM 5000 UNITS: 5000 INJECTION INTRAVENOUS; SUBCUTANEOUS at 16:00

## 2019-01-09 RX ADMIN — SENNOSIDES 8.6 MG: 8.6 TABLET, FILM COATED ORAL at 08:04

## 2019-01-09 RX ADMIN — HEPARIN SODIUM 5000 UNITS: 5000 INJECTION INTRAVENOUS; SUBCUTANEOUS at 05:29

## 2019-01-09 RX ADMIN — ASPIRIN 81 MG: 81 TABLET, COATED ORAL at 08:04

## 2019-01-09 RX ADMIN — MIRTAZAPINE 30 MG: 30 TABLET ORAL at 21:29

## 2019-01-09 RX ADMIN — METOPROLOL TARTRATE 2.5 MG: 1 INJECTION, SOLUTION INTRAVENOUS at 01:51

## 2019-01-09 RX ADMIN — DOCUSATE SODIUM 100 MG: 100 CAPSULE, LIQUID FILLED ORAL at 08:04

## 2019-01-09 RX ADMIN — LEVOTHYROXINE SODIUM 75 MCG: 75 TABLET ORAL at 05:28

## 2019-01-09 RX ADMIN — LISINOPRIL 10 MG: 10 TABLET ORAL at 08:04

## 2019-01-09 NOTE — PLAN OF CARE
DISCHARGE PLANNING     Discharge to home or other facility with appropriate resources Progressing        Nutrition/Hydration-ADULT     Nutrient/Hydration intake appropriate for improving, restoring or maintaining nutritional needs Progressing        PAIN - ADULT     Verbalizes/displays adequate comfort level or baseline comfort level Progressing        Potential for Falls     Patient will remain free of falls Progressing        SAFETY ADULT     Maintain or return to baseline ADL function Progressing     Maintain or return mobility status to optimal level Progressing

## 2019-01-09 NOTE — ASSESSMENT & PLAN NOTE
· Probably multifactorial given advanced age, deconditioning, depression, possible cardiac event  · PT/OT evals done and I think patient is stable to go back to her assisted living

## 2019-01-09 NOTE — PLAN OF CARE
Problem: OCCUPATIONAL THERAPY ADULT  Goal: Performs self-care activities at highest level of function for planned discharge setting  See evaluation for individualized goals  Treatment Interventions: ADL retraining, Functional transfer training, UE strengthening/ROM, Endurance training, Patient/family training, Equipment evaluation/education, Continued evaluation, Energy conservation, Activityengagement          See flowsheet documentation for full assessment, interventions and recommendations  Limitation: Decreased ADL status, Decreased UE strength, Decreased endurance, Decreased self-care trans, Decreased high-level ADLs  Prognosis: Fair  Assessment: Pt is a 81 y/o female seen for OT eval s/p adm to [facility] w/ generalized weakness, fatigue, and depression  Pt is dx'd w/ NSTEMI  Comorbidities include a h/o depression w/ anxiety, HTN, hypothroidism  Pt with active OT orders and up with assistance  orders  Pt lives with facility staff at [address] [facility] in [address], [address], elevator inside  Pt had assist w/ ADLS (assist for bathing 2x/wk, dresses independently), has assist for IADLS, does not drive, & required use of DME PTA including RW and s/c  Pt is currently demonstrating the following occupational deficits: Min A overall ADLS, Min A transfers/functional mobility w/ RW  These deficits that are impacting pt's baseline areas of occupation are a result of the following impairments: pain, endurance, activity tolerance, functional mobility, forward functional reach, balance, functional standing tolerance, decreased I w/ ADLS/IADLS and strength  The following Occupational Performance Areas to address include: eating, grooming, bathing/shower, toilet hygiene, dressing, socialization, health maintenance, functional mobility, community mobility and clothing management  Pt scored overall 50/100 on the Barthel Index   Based on the aforementioned OT evaluation, functional performance deficits, and assessments, pt has been identified as a moderate complexity evaluation  At this time, pt requires Min A overall for ADLS and functional mobility; if Min A able to be provided to pt at [facility] then would recommend pt D/C back to prior living arrangements w/ recommended level of assist however if that is not able to be provided then recommend pt D/C to STR   Pt to continue to benefit from acute immediate OT services to address the following goals 3-5x/week to  w/in 7-10 days:      OT Discharge Recommendation: Short Term Rehab (vs  back to [facility] pending level of assist available at [facility])  OT - OK to Discharge: Yes (when medically stable)      Comments: Ronel Higuera MS, OTR/L

## 2019-01-09 NOTE — SOCIAL WORK
DC Plan:     SILVIA Baron of [facility] advised pt is medically cleared for discharge  Therapy recommendations for increased services at Asst living vs STR  Family expressed preference for return to AL facility  CM called and spoke with SHAAN Cotton at [address] by the [facility]; Suzette Cotton reported that they need time to set up increased services and medications needed for patient's return  Suzette Cotton stated they can accept the patient, for return to AL with increased services, tomorrow by late am    CM called and spoke with Quiana Qiu at [address] [facility] [address]  set for 1pm  CM advised SHAAN Blood, Pt's Dtr Ishaan at [address] [place] [address] by the [facility]  CM entered facility contact information into EPIC

## 2019-01-09 NOTE — PLAN OF CARE
Nutrition/Hydration-ADULT     Nutrient/Hydration intake appropriate for improving, restoring or maintaining nutritional needs Not Progressing          DISCHARGE PLANNING     Discharge to home or other facility with appropriate resources Progressing        PAIN - ADULT     Verbalizes/displays adequate comfort level or baseline comfort level Progressing        Potential for Falls     Patient will remain free of falls Progressing        SAFETY ADULT     Maintain or return to baseline ADL function Progressing     Maintain or return mobility status to optimal level Progressing

## 2019-01-09 NOTE — CONSULTS
Consultation - Geriatrics   Alayna Olguin 80 y o  female MRN: [id number]  Unit/Bed#: CW2 210-01 Encounter: [id number]      Assessment/Plan  1  Depression  Would recommend addition of Zoloft 25 mg daily, on top of patient's Remeron 30 mg q h s  Patient is not agreeable to starting Zoloft at this time  Spoke with [facility] about recommendations    2  Forgetfulness  Patient admits to feeling forgetful, likely age-related  Patient alert and oriented x4, 2/3 on delayed recall, refuses to draw clock  Patient should be encouraged to keep mind active to prevent further decline  Patient may follow up at [facility] for [facility] upon discharge for formal cognitive assessment    3  Ambulatory dysfunction  Patient ambulates with rolled walker at baseline  PT, OT  Suspect patient will discharge back to facility today per slim    4  Deconditioning  Chronic  Mobilize frequently to prevent further decline   PT, OT recommending return to assisted living facility with assist x1 for mobility    5  Vitamin-D deficiency  Recommend patient take vitamin-D3 1000 International Units daily     6  Delirium precautions  Consider Tylenol 650 mg Q 8  Utilize if further pain medications on geriatric pain med order set if applicable  Continue with Colace daily  Monitor closely for urinary retention, bladder scan and straight cath as needed  Patient at risk for developing delirium, delirium preventing tactics advised  Redirect unwanted patient behaviors as first line tx  Reorient patient frequently  Avoid deliriogenic meds including tramadol, benzodiazepines, benadryl  Good sleep hygiene important, limit night time interruptions  Encourage patient to stay awake during the day  Ensure adequate hydration/nutrition  Mobilize often     7  Generalized weakness  Multifactorial, related to patient's poor mood, as well as deconditioning at baseline ambulatory dysfunction  Poor p o   Intake secondary to mood is well  Patient will benefit from additional antidepressant but is not agreeable      History of Present Illness   Physician Requesting Consult: Felix Taveras MD  Reason for Consult / Principal Problem: depression  Hx and PE limited by: n/a  HPI: Radha Wheeler is a 80y o  year old female with past medical history depression with anxiety, vitamin  D deficiency, generalized weakness, hypothyroidism, who presents to [facility] from assisted living facility with generalized weakness  The patient's depression has worsened, she has little interest in eating or getting out of bed  Apparently late 's birthday was recent, which has made her mood worse  Prior to arrival patient lives at [facility]  She had assistance with ADLs, IADLs  She ambulated with a walker  Upon exam patient has a flat affect  She is not agreeable to trying to new antidepressant and says her issues are "personal" and she doesn't want to talk about them  I encourage patient that she would benefit from additional antidepressant to make her feel more like herself but she refuses  Attempted to call daughter, no answer  Did speak with [facility] provider about recommendations  Inpatient consult to Gerontology  Consult performed by: Ata Patel ordered by: Adriana Mcneill          Review of Systems   Eyes: Negative for visual disturbance  Respiratory: Negative for chest tightness and shortness of breath  Cardiovascular: Negative for chest pain and palpitations  Gastrointestinal: Negative for abdominal distention, abdominal pain, constipation, diarrhea, nausea and vomiting  Genitourinary: Negative for difficulty urinating  Musculoskeletal: Negative for myalgias  Neurological: Negative for headaches  Psychiatric/Behavioral: Positive for confusion (admits to forgetfulness) and dysphoric mood  All other systems reviewed and are negative        Historical Information   Past Medical History:   Diagnosis Date    Depression with anxiety     Hypertension     Hypothyroidism      Past Surgical History:   Procedure Laterality Date    CATARACT EXTRACTION      CHOLECYSTECTOMY       Social History   History   Alcohol Use No     History   Drug Use No     History   Smoking Status    Former Smoker   Smokeless Tobacco    Never Used         Family History: non-contributory    Meds/Allergies   Current meds:   Current Facility-Administered Medications   Medication Dose Route Frequency    acetaminophen (TYLENOL) tablet 650 mg  650 mg Oral Q6H PRN    aspirin (ECOTRIN LOW STRENGTH) EC tablet 81 mg  81 mg Oral Daily    calcium carbonate (TUMS) chewable tablet 1,000 mg  1,000 mg Oral Daily PRN    docusate sodium (COLACE) capsule 100 mg  100 mg Oral BID    heparin (porcine) subcutaneous injection 5,000 Units  5,000 Units Subcutaneous Q8H [address]    levothyroxine tablet 75 mcg  75 mcg Oral Early Morning    lisinopril (ZESTRIL) tablet 10 mg  10 mg Oral Daily    mirtazapine (REMERON) tablet 30 mg  30 mg Oral HS    nitroglycerin (NITROSTAT) SL tablet 0 4 mg  0 4 mg Sublingual Q5 Min PRN    ondansetron (ZOFRAN) injection 4 mg  4 mg Intravenous Q6H PRN    senna (SENOKOT) tablet 8 6 mg  1 tablet Oral Daily      Current PTA meds:  Prescriptions Prior to Admission   Medication    aspirin (ASPIR-81) 81 mg EC tablet    levothyroxine 75 mcg tablet    lisinopril (ZESTRIL) 10 mg tablet    mirtazapine (REMERON) 30 mg tablet    multivitamin (THERAGRAN) TABS    carbamide peroxide (DEBROX) 6 5 % otic solution        Allergies   Allergen Reactions    Hydrochlorothiazide        Objective   Vitals: Blood pressure (!) 175/81, pulse 80, temperature 97 5 °F (36 4 °C), temperature source Oral, resp  rate 18, height 5' 3" (1 6 m), weight 61 9 kg (136 lb 8 oz), SpO2 96 %  ,Body mass index is 24 18 kg/m²  Physical Exam   Constitutional: She is oriented to person, place, and time  She appears well-developed  No distress     HENT:   Head: Normocephalic and atraumatic  Mouth/Throat: No oropharyngeal exudate  Eyes: Conjunctivae and EOM are normal  No scleral icterus  Neck: Neck supple  Cardiovascular: Normal rate  Pulmonary/Chest: Effort normal and breath sounds normal  She has no wheezes  She has no rales  Abdominal: Soft  Bowel sounds are normal  She exhibits no distension  Musculoskeletal: She exhibits no edema  Neurological: She is alert and oriented to person, place, and time  Skin: Skin is warm and dry  Psychiatric: She is slowed and withdrawn  She is not agitated and not actively hallucinating  She exhibits a depressed mood  Patient admits to forgetfulness  Is alert oriented x4  Scores 2/3 on delayed recall  Refuses draw clock  Suspect age-related cognitive changes  Does appear depressed with flat affect  Is not agreeable to starting another antidepressant  Nursing note and vitals reviewed  Lab Results:   Results from last 7 days  Lab Units 01/09/19  0540   WBC Thousand/uL 4 56   HEMOGLOBIN g/dL 12 3   HEMATOCRIT % 38 8   PLATELETS Thousands/uL 162        Results from last 7 days  Lab Units 01/09/19  0540 01/08/19  1442   POTASSIUM mmol/L 4 9 5 0   CHLORIDE mmol/L 111* 111*   CO2 mmol/L 23 25   BUN mg/dL 25 27*   CREATININE mg/dL 1 09 1 23   CALCIUM mg/dL 9 7 10 1   ALK PHOS U/L  --  69   ALT U/L  --  15   AST U/L  --  22       Imaging Studies: I have personally reviewed pertinent reports  EKG, Pathology, and Other Studies: I have personally reviewed pertinent reports      VTE Prophylaxis: Sequential compression device (Venodyne)     Code Status: Level 3 - DNAR and DNI

## 2019-01-09 NOTE — UTILIZATION REVIEW
[address] Utilization Review Department  Phone: [phone]; Fax [phone]  [email or web address]  ATTENTION: Please call with any questions or concerns to [phone]  and carefully listen to the prompts so that you are directed to the right person  Send all requests for admission clinical reviews, approved or denied determinations and any other requests to fax [phone]  All voicemails are confidential     ===================================================================    Continued Stay Review    Date: 01/09/2018  Vital Signs: BP (!) 175/81   Pulse 80   Temp 97 5 °F (36 4 °C) (Oral)   Resp 18   Ht 5' 3" (1 6 m)   Wt 61 9 kg (136 lb 8 oz)   SpO2 96%   BMI 24 18 kg/m²   Assessment/Plan:   * NSTEMI (non-ST elevated myocardial infarction) (HCC)   Assessment & Plan     · Patient without cardiac complaints but overall feels weak and terrible   · Possible type 2 from elevated BP  · Troponins remained slightly elevated but did not peak   · Await echocardiogram      Stage 3 chronic kidney disease (HCC)   Assessment & Plan     · Creatinine at baseline   Monitor      Generalized weakness   Assessment & Plan     · Probably multifactorial given advanced age, deconditioning, depression, possible cardiac event  · PT/OT evals done and I think patient is stable to go back to her assisted living       Depression with anxiety   Assessment & Plan     · Depression increased recently given her late 's birthday was three days ago  · Continue Remeron  · Geriatrics has been consulted and feel this is largely contributing to the patient's generalized weakness and symptoms          VTE Pharmacologic Prophylaxis:   Pharmacologic: Heparin  Mechanical VTE Prophylaxis in Place: Yes   Patient is in observation and will work on discharge plan for today     Medications:   Scheduled Meds:   Current Facility-Administered Medications:  acetaminophen 650 mg Oral Q6H PRN Valeriano Lund PA-C   aspirin 81 mg Oral Daily Valeriano Lund PA-C   calcium carbonate 1,000 mg Oral Daily PRN Valeriano Lund PA-C   docusate sodium 100 mg Oral BID Valeriano Lund PA-C   heparin (porcine) 5,000 Units Subcutaneous [facility]   levothyroxine 75 mcg Oral Early Morning Valeriano Lund PA-C   lisinopril 10 mg Oral Daily Valeriano Lund PA-C   mirtazapine 30 mg Oral HS Valeriano Lund PA-C   nitroglycerin 0 4 mg Sublingual Q5 Min PRN Valeriano Lund PA-C   ondansetron 4 mg Intravenous Q6H PRN Valeriano Lund PA-C   senna 1 tablet Oral Daily Valeriano Lund PA-C     Pertinent Labs/Diagnostic Results:   Age/Sex: 80 y o  female   Discharge Plan: TBD

## 2019-01-09 NOTE — ASSESSMENT & PLAN NOTE
· Patient without cardiac complaints but overall feels weak and terrible   · Possible type 2 from elevated BP  · Troponins remained slightly elevated but did not peak   · Await echocardiogram

## 2019-01-09 NOTE — PHYSICAL THERAPY NOTE
Physical Therapy Evaluation     Patient's Name: Finesse Petty    Admitting Diagnosis  Depression with anxiety [F41 8]  Weakness [R53 1]  Elevated troponin [R74 8]  NSTEMI (non-ST elevated myocardial infarction) ([facility] [address] ) [I21 4]  Generalized weakness [R53 1]    Problem List  Patient Active Problem List   Diagnosis    Essential hypertension    Hypothyroidism    Depression with anxiety    Vitamin D deficiency    [facility]    Generalized weakness    Stage 3 chronic kidney disease ([facility] )       Past Medical History  Past Medical History:   Diagnosis Date    Depression with anxiety     Hypertension     Hypothyroidism        Past Surgical History  Past Surgical History:   Procedure Laterality Date    CATARACT EXTRACTION      CHOLECYSTECTOMY        01/09/19 0904   Note Type   Note type Eval only   Pain Assessment   Pain Assessment 0-10   Pain Score 5   Pain Type Chronic pain   Pain Location Back;Buttocks   Home Living   Type of Home Assisted living   Home Layout Elevator;Multi-level   Bathroom Shower/Tub Walk-in shower   Bathroom Toilet Raised   Bathroom Equipment Shower chair   Home Equipment Walker   Prior Function   Level of [place] Independent with ADLs and functional mobility   Lives With Facility staff   Receives Help From Other (Comment)  (STAFF)   ADL Assistance Needs assistance   IADLs Needs assistance   Vocational Retired   Restrictions/Precautions   [place] Bearing Precautions Per Order No   Braces or Orthoses (NONE)   Other Precautions Pain; Fall Risk;Telemetry; Bed Alarm; Chair Alarm  (CHAIR ALARM ACTIVE POST PT EVAL )   General   Family/Caregiver Present No   Cognition   Arousal/Participation Cooperative   RUE Assessment   RUE Assessment WFL   LUE Assessment   LUE Assessment WFL   RLE Assessment   RLE Assessment X   Strength RLE   RLE Overall Strength 3+/5   LLE Assessment   LLE Assessment X   Strength LLE   LLE Overall Strength 3+/5   Bed Mobility Supine to Sit 5  Supervision   Additional items HOB elevated; Increased time required   Sit to Supine Unable to assess   Transfers   Sit to Stand 4  Minimal assistance   Additional items Assist x 1; Increased time required   Stand to Sit 4  Minimal assistance   Additional items Assist x 1   Ambulation/Elevation   Gait pattern Excessively slow; Short stride; Foward flexed; Shuffling;Decreased foot clearance   Gait Assistance 4  Minimal assist   Additional items Assist x 1   Assistive Device Rolling walker   Distance 5 FEET X2   Balance   Static Sitting Good   Static Standing Fair -   Ambulatory Poor +   Endurance Deficit   Endurance Deficit Yes   Endurance Deficit Description GROSS DECONDITIONING    Activity Tolerance   Activity Tolerance Patient limited by fatigue   Medical Staff Made Aware GEENA Multani 26   Nurse Made Aware SHAAN DOWNS    Assessment   Prognosis Fair   Problem List Decreased strength;Decreased endurance; Impaired balance;Decreased mobility;Pain   Assessment PT COMPLETED EVALUATION OF 80YEAR OLD FEMALE ADMITTED TO [facility] ON 1/8/19 WITH GENERALIZED WEAKNESS X 2 WEEKS WITH RECENT INABILITY TO GET OUT OF BED BY HERSELF  PATIENT IS PENDING GERIATRIC EVALUATION AND ECHO  CURRENT MEDICAL AND PHYSICAL INSTABILITIES INCLUDE PAIN, TELEMETRY MONITORING, FALLS RISK, BED/CHAIR ALARMS, AND A REGRESSION IN FUNCTIONAL STATUS FROM BASELINE  PMH IS SIGNIFICANT FOR HTN, DEPRESSION, AND ANXIETY  PRIOR TO THIS ADMISSION PATIENT REPORTS RESIDING AT [facility] LIVING  PATIENT REPORTS THAT NORMALLY SHE IS ABLE TO GET OUT OF BED, DRESSED, AND AMBULATE WITH RW BY HERSELF TO AND FROM MEALS  SHE REPORTS THAT WHEN SHE IS UNABLE TO DO SO SHE CALLS FOR ASSISTANCE  CURRENT IMPAIRMENTS INCLUDE PAIN (5/10, BUTTOCK/BACK), DECREASED GROSS STRENGTH, BALANCE, AND ABILITY TO AMBULATE  DURING PT EVALUATION PATIENT REQUIRED S W/ INCREASED TIME FOR SUPINE-->SIT TRANSFER AND MIN-AX1 FOR SIT-->STAND TRANSFER AND SHORT DISTANCE AMBULATION  PATIENT AMBULATED 5 FEET X 2 W/ RW PRESENTING WITH REDUCED GAIT SPEED AND B/L LE CLEARANCE  AT THIS POINT IN TIME THIS PATIENT IS GROSSLY WEAK AND HIGH FALLS RISK  SHE REQUIRES  MIN-AX1 FOR PARTICIPATION IN SAFE MOBILITY  IF THIS LEVEL OF ASSISTANCE IS UNAVAILABLE TO HER UPON RETURN TO [facility], PT D/C RECOMMENDATION WOULD BE FOR STR  PATIENT WILL BENEFIT FROM CONTINUED SKILLED PT THIS ADMISSION TO ACHIEVE MAXIMAL FUNCTION AND SAFETY  Goals   Patient Goals TO REST    LTG Expiration Date 01/23/19   Long Term Goal #1 1) PERFORM BED MOBILITY MOD-I TO PARTICIPATE IN FREQUENT REPOSITIONING AND IMPROVE SKIN INTEGRITY; 2) PERFORM SIT<-->STAND TRANSFER MOD-I TO PROMOTE I WITH TOILETING AND OOB MOBILITY; 3) AMBULATE 150 FEET MOD-I W/ LEAST RESTRICTIVE DEVICE TO PARTICIPATE IN HOUSEHOLD AND COMMUNITY LEVEL AMBULATION; 4) IMPROVE B/L LE STRENGTH BY 1/2 GRADE TO IMPROVE EFFICIENCY OF TRANSFERS; 5) IMPROVE BALANCE BY 1 GRADE TO REDUCE RISK FOR FALLS   Treatment Day 0   Plan   Treatment/Interventions Functional transfer training;LE strengthening/ROM; Therapeutic exercise; Endurance training;Equipment eval/education; Bed mobility;Gait training;OT;Spoke to nursing;Spoke to advanced practitioner  (GERIATRIC PHYSICIAN PRESENT )   PT Frequency Other (Comment)  (3-5X/WK)   Recommendation   Recommendation Other (Comment)  (RETURN TO snf W/ AX1 FOR MOBILITY; IF UNAVAILABLE- STR )   Equipment Recommended Walker   PT - OK to Discharge Yes  (TO [facility] W/ AX1 FOR MOBILITY OR STR )   Barthel Index   Feeding 10   Bathing 0   Grooming Score 0   Dressing Score 5   Bladder Score 5   Bowels Score 10   Toilet Use Score 5   Transfers (Bed/Chair) Score 10   Mobility (Level Surface) Score 0   Stairs Score 0   Barthel Index Score 45           Nat Corrigan, PT

## 2019-01-09 NOTE — OCCUPATIONAL THERAPY NOTE
[address] Evaluation     Patient Name: Maggie Alvarado  CHJCD'H Date: 1/9/2019  Problem List  Patient Active Problem List   Diagnosis    Essential hypertension    Hypothyroidism    Depression with anxiety    Vitamin D deficiency    [facility]I [facility]non-ST elevated [facility] infarction) [facility] [address] [facility]    Generalized weakness    Stage 3 chronic kidney disease ([facility] [address] )     Past Medical History  Past Medical History:   Diagnosis Date    Depression with anxiety     Hypertension     Hypothyroidism      Past Surgical History  Past Surgical History:   Procedure Laterality Date    CATARACT EXTRACTION      CHOLECYSTECTOMY             01/09/19 0903   Note Type   Note type Eval/Treat   Restrictions/Precautions   Weight Bearing Precautions Per Order No   Other Precautions Contact/isolation;Cognitive; Chair Alarm; Fall Risk;Pain   Pain Assessment   Pain Assessment 0-10   Pain Score 5   Pain Type Acute pain   Pain Location Buttocks;Leg   Pain Orientation Bilateral;Lower   Pain Descriptors Aching;Discomfort   Pain Frequency Intermittent   Pain Onset Ongoing   Clinical Progression Not changed   Patient's Stated Pain Goal No pain   Hospital Pain Intervention(s) Repositioned; Ambulation/increased activity; Emotional support   Response to Interventions tolerated   Home Living   Type of Home Assisted living; Other (Comment)  ([facility])   Home Layout One level;Performs ADLs on one level; Able to live on main level with bedroom/bathroom; Elevator   Bathroom Shower/Tub Walk-in shower   Bathroom Toilet Standard   Bathroom Equipment Shower chair;Grab bars in shower;Grab bars around toilet   [address] [facility]   Additional Comments Pt reports living at [facility] in [place]; no HARRY; elevator inside   Prior Function   Level of [place] Needs assistance with IADLs; Needs assistance with ADLs and functional mobility   Lives With Alone; Facility staff   Receives Help From Other (Comment)  (facility staff)   ADL Assistance Needs assistance   IADLs Needs assistance   Falls in the last 6 months 0   Vocational Retired   Comments Pt reports needing assist w/ ADLS (dresses on own w/ increased time), assist for iADLS, and Mod I w/ transfers/functional mobility PTA   Lifestyle   Autonomy Assist ADLS/IADLS, Mod I transfers/functional mobility PTA   Reciprocal Relationships Pt lives alone; facility staff assist PRN   Service to Others Pt is retired   Intrinsic Gratification pt reports she does not have any enjoyable activities at her age; used to travel w/ spouse who is now    Psychosocial   Psychosocial (WDL) X   Patient Behaviors/Mood Depressed;Flat affect   ADL   Eating Assistance 7  Independent   Grooming Assistance 5  Supervision/Setup   UB [address] 4  Minimal Assistance   LB [address] 4  Minimal Assistance   Functional Deficit Verbal cueing;Supervision/safety; Increased time to complete   Bed Mobility   Supine to Sit 5  Supervision   Additional items HOB elevated; Increased time required;Verbal cues   Sit to Supine Unable to assess   Additional Comments pt went from supine to sit w/ S for safety   Transfers   Sit to Stand 4  Minimal assistance   Additional items Assist x 1; Increased time required;Verbal cues   Stand to Sit 4  Minimal assistance   Additional items Assist x 1; Increased time required;Verbal cues   Additional Comments Pt performed sit-stand from EOb w/ Min Ax 1 for safety/balance and increased time, VC for proper hand placement on RW   Functional Mobility   Functional Mobility 4  Minimal assistance   Additional Comments pt ambulated short household distance w/ Min A x1, use of RW for support/stability   Additional items Rolling walker   Balance   Static Sitting Good   Dynamic Sitting Fair +   Static Standing Fair   Ambulatory Fair -   Activity Tolerance   Activity Tolerance Patient limited by fatigue   Medical Staff Made Aware PT, Emily   Nurse Made Aware yes, Jose   RUE Assessment   RUE Assessment WFL   LUE Assessment   LUE Assessment WFL   Hand Function   Gross Motor Coordination Functional   Fine Motor Coordination Functional   Cognition   Overall Cognitive Status WFL   Arousal/Participation Responsive; Cooperative   Attention Within functional limits   Orientation Level Oriented X4   Memory Within functional limits   Following Commands Follows all commands and directions without difficulty   Comments Pt is pleasant and cooperative   Assessment   Limitation Decreased ADL status; Decreased UE strength;Decreased endurance;Decreased self-care trans;Decreased high-level ADLs   Prognosis Fair   Assessment Pt is a 81 y/o female seen for OT eval s/p adm to [facility] w/ generalized weakness, fatigue, and depression  Pt is dx'd w/ NSTEMI  Comorbidities include a h/o depression w/ anxiety, HTN, hypothroidism  Pt with active OT orders and up with assistance  orders  Pt lives with facility staff at [address] [facility] in [address], 0 HARRY, elevator inside  Pt had assist w/ ADLS (assist for bathing 2x/wk, dresses independently), has assist for IADLS, does not drive, & required use of DME PTA including RW and s/c  Pt is currently demonstrating the following occupational deficits: Min A overall ADLS, Min A transfers/functional mobility w/ RW  These deficits that are impacting pt's baseline areas of occupation are a result of the following impairments: pain, endurance, activity tolerance, functional mobility, forward functional reach, balance, functional standing tolerance, decreased I w/ ADLS/IADLS and strength  The following Occupational Performance Areas to address include: eating, grooming, bathing/shower, toilet hygiene, dressing, socialization, health maintenance, functional mobility, community mobility and clothing management   Pt scored overall 50/100 on the Barthel Index  Based on the aforementioned OT evaluation, functional performance deficits, and assessments, pt has been identified as a moderate complexity evaluation  At this time, pt requires Min A overall for ADLS and functional mobility; if Min A able to be provided to pt at [facility] then would recommend pt D/C back to prior living arrangements w/ recommended level of assist however if that is not able to be provided then recommend pt D/C to STR  Pt to continue to benefit from acute immediate OT services to address the following goals 3-5x/week to  w/in 7-10 days:    Goals   Patient Goals to rest   LTG Time Frame 7-10   Long Term Goal #1 see below listed goals   Plan   Treatment Interventions ADL retraining;Functional transfer training;UE strengthening/ROM; Endurance training;Patient/family training;Equipment evaluation/education;Continued evaluation; Energy conservation; Activityengagement   Goal Expiration Date 19   OT Frequency 3-5x/wk   Recommendation   OT Discharge Recommendation Short Term Rehab  (vs  back to [facility] pending level of assist available at [facility])   OT - OK to Discharge Yes  (when medically stable)   Barthel Index   Feeding 10   Bathing 0   Grooming Score 5   Dressing Score 5   Bladder Score 5   Bowels Score 10   Toilet Use Score 5   Transfers (Bed/Chair) Score 10   Mobility (Level Surface) Score 0   Stairs Score 0   Barthel Index Score 50   Modified Weakley Scale   Modified Weakley Scale 4        GOALS    1) Pt will improve activity tolerance to G for min 30 min txment sessions for increase engagement in functional tasks    2) Pt will complete UB/LB dressing/self care w/ mod I using adaptive device and DME as needed    3) Pt will complete bathing w/ Mod I w/ use of AE and DME as needed    4) Pt will complete toileting w/ mod I w/ G hygiene/thoroughness using DME as needed    5) Pt will improve functional transfers to Mod I on/off all surfaces using DME as needed w/ G balance/safety     6) Pt will improve functional mobility during ADL/IADL/leisure tasks to Mod I using DME as needed w/ G balance/safety     7) Pt will be attentive 100% of the time during ongoing cognitive assessment w/ G participation to assist w/ safe d/c planning/recommendations    8) Pt will demonstrate G carryover of pt/caregiver education and training as appropriate w/o cues w/ good tolerance to increase safety during functional tasks    9) Pt will demonstrate 100% carryover of energy conservation techniques t/o functional I/ADL/leisure tasks w/o cues s/p skilled education to increase endurance during functional tasks     Ever Hays MS, OTR/L

## 2019-01-09 NOTE — PLAN OF CARE
Problem: PHYSICAL THERAPY ADULT  Goal: Performs mobility at highest level of function for planned discharge setting  See evaluation for individualized goals  Treatment/Interventions: Functional transfer training, LE strengthening/ROM, Therapeutic exercise, Endurance training, Equipment eval/education, Bed mobility, Gait training, OT, Spoke to nursing, Spoke to advanced practitioner (GERIATRIC PHYSICIAN PRESENT )  Equipment Recommended: Alysa Witt       See flowsheet documentation for full assessment, interventions and recommendations  Prognosis: Fair  Problem List: Decreased strength, Decreased endurance, Impaired balance, Decreased mobility, Pain  Assessment: PT COMPLETED EVALUATION OF 80YEAR OLD FEMALE ADMITTED TO [facility] ON 1/8/19 WITH GENERALIZED WEAKNESS X 2 WEEKS WITH RECENT INABILITY TO GET OUT OF BED BY HERSELF  PATIENT IS PENDING GERIATRIC EVALUATION AND ECHO  CURRENT MEDICAL AND PHYSICAL INSTABILITIES INCLUDE PAIN, TELEMETRY MONITORING, FALLS RISK, BED/CHAIR ALARMS, AND A REGRESSION IN FUNCTIONAL STATUS FROM BASELINE  PMH IS SIGNIFICANT FOR HTN, DEPRESSION, AND ANXIETY  PRIOR TO THIS ADMISSION PATIENT REPOR[facility]  PATIENT REPORTS THAT NORMALLY SHE IS ABLE TO GET OUT OF BED, DRESSED, AND AMBULATE WITH RW BY HERSELF TO AND FROM MEALS  SHE REPORTS THAT WHEN SHE IS UNABLE TO DO SO SHE CALLS FOR ASSISTANCE  CURRENT IMPAIRMENTS INCLUDE PAIN (5/10, BUTTOCK/BACK), DECREASED GROSS STRENGTH, BALANCE, AND ABILITY TO AMBULATE  DURING PT EVALUATION PATIENT REQUIRED S W/ INCREASED TIME FOR SUPINE-->SIT TRANSFER AND MIN-AX1 FOR SIT-->STAND TRANSFER AND SHORT DISTANCE AMBULATION  PATIENT AMBULATED 5 FEET X 2 W/ RW PRESENTING WITH REDUCED GAIT SPEED AND B/L LE CLEARANCE  AT THIS POINT IN TIME THIS PATIENT IS GROSSLY WEAK AND HIGH FALLS RISK  SHE REQUIRES  MIN-AX1 FOR PARTICIPATION IN SAFE MOBILITY   IF THIS LEVEL OF ASSISTANCE IS UNAVAILABLE TO HER UPON RETURN TO [facility], PT D/C RECOMMENDATION WOULD BE FOR STR  PATIENT WILL BENEFIT FROM CONTINUED SKILLED PT THIS ADMISSION TO ACHIEVE MAXIMAL FUNCTION AND SAFETY  Recommendation: (S) Other (Comment) (RETURN TO halfway W/ AX1 FOR MOBILITY; IF UNAVAILABLE- STR )     PT - OK to Discharge: (S) Yes (TO halfway W/ AX1 FOR MOBILITY OR STR )    See flowsheet documentation for full assessment     Ana Luisa Allred, PT

## 2019-01-09 NOTE — PROGRESS NOTES
Progress Note - Flaco Blow 10/30/1923, 80 y o  female MRN: [id number]    Unit/Bed#: CW2 210-01 Encounter: [id number]    Primary Care Provider: Freddy Kenny MD   Date and time admitted to hospital: 1/8/2019  2:30 PM        * NSTEMI (non-ST elevated myocardial infarction) [facility])   Assessment & Plan    · Patient without cardiac complaints but overall feels weak and terrible   · Possible type 2 from elevated BP  · Troponins remained slightly elevated but did not peak   · Await echocardiogram     Stage 3 chronic kidney disease ([facility] [address] )   Assessment & Plan    · Creatinine at baseline  Monitor     Generalized weakness   Assessment & Plan    · Probably multifactorial given advanced age, deconditioning, depression, possible cardiac event  · PT/OT evals done and I think patient is stable to go back to her assisted living       Depression with anxiety   Assessment & Plan    · Depression increased recently given her late 's birthday was three days ago  · Continue Remeron  · Geriatrics has been consulted and feel this is largely contributing to the patient's generalized weakness and symptoms  VTE Pharmacologic Prophylaxis:   Pharmacologic: Heparin  Mechanical VTE Prophylaxis in Place: Yes    Patient Centered Rounds: I have performed bedside rounds with nursing staff today  Discussions with Specialists or Other Care Team Provider:  Primary RN, case management    Education and Discussions with Family / Patient:  Patient and her daughter Isidra Matthews     Time Spent for Care: 30 minutes  More than 50% of total time spent on counseling and coordination of care as described above      Current Length of Stay: 0 day(s)    Current Patient Status: Observation   Certification Statement: Patient is in observation and will work on discharge plan for today    Discharge Plan / Estimated Discharge Date:  Could be transition to her assisted living facility if they will accept her back      Code Status: Level 3 - DNAR and DNI      Subjective:   Very difficult to obtain history from patient  Answers questions with 1 word  Denies chest pain when asked  States she feels terrible and her legs hurt when she walks  Has not slept well for weeks and has not eaten well for weeks  No overnight events  No chest pain or shortness of breath expressed  No fever or chills  Objective:     Vitals:   Temp (24hrs), Av 6 °F (36 4 °C), Min:97 3 °F (36 3 °C), Max:97 8 °F (36 6 °C)    Temp:  [97 3 °F (36 3 °C)-97 8 °F (36 6 °C)] 97 5 °F (36 4 °C)  HR:  [62-92] 80  Resp:  [16-22] 18  BP: (165-209)/(77-89) 175/81  SpO2:  [95 %-98 %] 96 %  Body mass index is 24 18 kg/m²  Input and Output Summary (last 24 hours): Intake/Output Summary (Last 24 hours) at 19 1030  Last data filed at 19 0759   Gross per 24 hour   Intake                0 ml   Output              500 ml   Net             -500 ml       Physical Exam:     Physical Exam   Constitutional: She is oriented to person, place, and time  No distress  HENT:   Head: Normocephalic  Eyes: Pupils are equal, round, and reactive to light  Neck: Normal range of motion  Neck supple  Cardiovascular: Normal rate, regular rhythm and normal heart sounds  No murmur heard  Pulmonary/Chest: Effort normal and breath sounds normal    Abdominal: Soft  Bowel sounds are normal    Musculoskeletal: Normal range of motion  She exhibits no edema  Neurological: She is alert and oriented to person, place, and time  Skin: Skin is warm and dry  Psychiatric:   Flat affect   Nursing note and vitals reviewed          Additional Data:     Labs:      Results from last 7 days  Lab Units 19  0540 19  1442   WBC Thousand/uL 4 56 5 93   HEMOGLOBIN g/dL 12 3 13 2   HEMATOCRIT % 38 8 42 1   PLATELETS Thousands/uL 162 183   NEUTROS PCT %  --  62   LYMPHS PCT %  --  26   MONOS PCT %  --  9   EOS PCT %  --  3       Results from last 7 days  Lab Units 19  0540 19  1442 POTASSIUM mmol/L 4 9 5 0   CHLORIDE mmol/L 111* 111*   CO2 mmol/L 23 25   BUN mg/dL 25 27*   CREATININE mg/dL 1 09 1 23   CALCIUM mg/dL 9 7 10 1   ALK PHOS U/L  --  69   ALT U/L  --  15   AST U/L  --  22                 Recent Cultures (last 7 days):       Results from last 7 days  Lab Units 01/08/19  1833   INFLUENZA B PCR  None Detected   RSV PCR  None Detected       Last 24 Hours Medication List:     Current Facility-Administered Medications:  acetaminophen 650 mg Oral Q6H PRN Kaylynn Coffin, PA-C   aspirin 81 mg Oral Daily Kaylynn Coffin, PA-C   calcium carbonate 1,000 mg Oral Daily PRN Kaylynn Coffin, PA-C   docusate sodium 100 mg Oral BID Kaylynn Coffin, PA-C   heparin (porcine) 5,000 Units Subcutaneous [facility] Kaylynn Coffin, PA-C   levothyroxine 75 mcg Oral Early Morning Kaylynn Coffin, PA-C   lisinopril 10 mg Oral Daily Kaylynn Coffin, PA-C   mirtazapine 30 mg Oral HS Kaylynn Coffin, PA-C   nitroglycerin 0 4 mg Sublingual Q5 Min PRN Kaylynn Coffin, PA-C   ondansetron 4 mg Intravenous Q6H PRN Kaylynn Coffin, PA-C   senna 1 tablet Oral Daily Kaylynn Coffin, PA-C        Today, Patient Was Seen By: BRODY Huertas    ** Please Note: Dragon 360 Dictation voice to text software may have been used in the creation of this document   **

## 2019-01-09 NOTE — ASSESSMENT & PLAN NOTE
· Depression increased recently given her late 's birthday was three days ago  · Continue Remeron  · Geriatrics has been consulted and feel this is largely contributing to the patient's generalized weakness and symptoms

## 2019-01-09 NOTE — SOCIAL WORK
Initial interview:     CM met with the patient, Dtr Aylin Lacey and RANJEET Fuller, at bedside, to review the CM role and discuss possible dc needs  Pt is a [facility]  Pt gets assist with bathing twice weekly; pt uses a rollator for all ambulation/mobility  No hx of VNA  or IP rehab  Pt denied drug, etoh or mental illness history  No POA  Has a LW; copy is in [employer] chart  Prescriptions are filled thru [facility] or thru [facility] in [place]  Main contact: Dtr Gregory Carranza [phone], [phone]  RANJEET Fuller [place] [phone]  Pt has a Son who lives in [facility]  Admit Dx: NSTEMI  Hx CKD, Depression, Anxiety  Pt's Dtr expressed understanding of pt's diagnoses and treatment regimen  Pt [place] and spoke very little during intake, but is alert/oriented x4  DC Plan - therapy stated ok to return to Assist Living pending level of care vs STR  Dtr advised and stated preference for return to AL facility  Pt awaiting ECHO for dc readiness  CM will follow  CM reviewed d/c planning process including the following: identifying help at home, patient preference for d/c planning needs, Discharge Lounge, [facility] Meds to Bed program, availability of treatment team to discuss questions or concerns patient and/or family may have regarding understanding medications and recognizing signs and symptoms once discharged  CM also encouraged patient to follow up with all recommended appointments after discharge  Patient advised of importance for patient and family to participate in managing patients medical well being

## 2019-01-09 NOTE — UTILIZATION REVIEW
[address]  Utilization Review Department  Phone: [phone]; Fax [phone]  [email or web address]  ATTENTION: Please call with any questions or concerns to [phone]  and carefully listen to the prompts so that you are directed to the right person  Send all requests for admission clinical reviews, approved or denied determinations and any other requests to fax [phone]  All voicemails are confidential     ========================================================================    Initial Clinical Review    Admission: Date/Time/Statement: 01/08/2019 @ 1626  Orders Placed This Encounter   Procedures    Place in Observation (expected length of stay for this patient is less than two midnights)     Standing Status:   Standing     Number of Occurrences:   1     Order Specific Question:   Admitting Physician     Answer:   sEtee Greer [[id number]]     Order Specific Question:   Level of Care     Answer:   Med Surg [16]     ED: Date/Time/Mode of Arrival:   ED Arrival Information     Expected Arrival Acuity Means of Arrival Escorted By Service Admission Type    - 1/8/2019 14:29 Urgent Ambulance [facility] General Medicine Urgent    Arrival Complaint    weakness        Chief Complaint:   Chief Complaint   Patient presents with    Weakness - Generalized     patient complains of increase tiredness  weakness and not feeling well  Denies any chest pain/SOB  Decrease appetite and did not take her medications today  History of Illness:   Swati Saavedra is a 80 y o  female with history of hypertension, CKD, and hypothyroidism who presents from assisted living facility with generalized weakness over the last 2 weeks  She reports fatigue and has little interest in eating or getting out of bed  She has been progressively weak and this morning told staff she was too weak to get out of bed and therefore she came to the emergency room for evaluation    She feels she may be more depressed than normal  She uses a walker to ambulate      The patient's daughter tells me she has been less interested in participating actively in activities at her assisted living facility  She normally goes down the stairs to meals 3 times a day but has often been skipping meals  She does note that three days ago was the birthday of the patient's , who has       In the emergency room, patient was noted to have troponin of 0 13 and elevated BNP  ED Vital Signs:   ED Triage Vitals [19 1439]   Temperature Pulse Respirations Blood Pressure SpO2   (!) 97 3 °F (36 3 °C) 92 18 (!) 209/89 98 %      Temp Source Heart Rate Source Patient Position - Orthostatic VS BP Location FiO2 (%)   Oral Monitor Lying Left arm --      Pain Score       3        Wt Readings from Last 1 Encounters:   19 61 9 kg (136 lb 8 oz)   Pertinent Labs/Diagnostic Test Results:   WBC Thousand/uL 5 93   HEMOGLOBIN g/dL 13 2   HEMATOCRIT % 42 1   PLATELETS Thousands/uL 183     SODIUM mmol/L 140   POTASSIUM mmol/L 5 0   CHLORIDE mmol/L 111*   CO2 mmol/L 25   BUN mg/dL 27*   CREATININE mg/dL 1 23   ANION GAP mmol/L 4   CALCIUM mg/dL 10 1   ALBUMIN g/dL 3 4*   TOTAL BILIRUBIN mg/dL 0 43   ALK PHOS U/L 69   ALT U/L 15   AST U/L 22   GLUCOSE RANDOM mg/dL 98     Color, UA Yellow   Clarity, UA Clear   pH, UA 5 5 4 5 - 8 0    Leukocytes, UA Trace (A) Negative    Nitrite, UA Negative Negative    Protein, UA Negative Negative mg/dl    Glucose, UA Negative Negative mg/dl    Ketones, UA Negative Negative mg/dl    Urobilinogen, UA 0 2 0 2, 1 0 E U /dl E U /dl    Bilirubin, UA Negative Negative    Blood, UA Trace (A) Negative    Specific Gravity, UA 1 015 1 003 - 1 030      EK, Normal sinus  Nonspecific ST changes    Chest X:No acute cardiopulmonary disease      Trop  0 13  NT-proBNP 2,154 (H) pg/mL     ED Treatment:   Medication Administration from 2019 1429 to 2019 1912       Date/Time Order Dose Route Action Action by Comments     01/08/2019 1701 sodium chloride 0 9 % bolus 1,000 mL 0 mL Intravenous Stopped Deneen Company, RN      01/08/2019 1547 sodium chloride 0 9 % bolus 1,000 mL 1,000 mL Intravenous 1333 [facility] Mitali, RN      01/08/2019 1546 aspirin chewable tablet 324 mg 324 mg Oral Given Deneen Luque RN         Past Medical/Surgical History: Active Ambulatory Problems     Diagnosis Date Noted    Essential hypertension 04/12/2018    Hypothyroidism 04/12/2018    Depression with anxiety 04/12/2018    Vitamin D deficiency 08/08/2018     Past Medical History:   Diagnosis Date    Depression with anxiety     Hypertension     Hypothyroidism      Admitting Diagnosis: Depression with anxiety [F41 8]  Weakness [R53 1]  Elevated troponin [R74 8]  NSTEMI (non-ST elevated myocardial infarction) ([facility] [address] ) [I21 4]  Generalized weakness [R53 1]  Age/Sex: 80 y o  female  Assessment/Plan:   * NSTEMI (non-ST elevated myocardial infarction) ([facility] [address] )   Assessment & Plan     · Patient without cardiac complaints  · Possible type 2 from elevated BP  · Will trend troponins  Obtain echo  · If troponins markedly elevate or abnormalities on echo then would consult cardiology  · Hold on heparin drip unless troponins trend up signficantly      Generalized weakness   Assessment & Plan     · Probably multifactorial given advanced age, deconditioning, depression, possible cardiac event  · PT/OT evals  · Check TSH      Stage 3 chronic kidney disease ([facility] [address] )   Assessment & Plan     · Creatinine at baseline  Monitor      Depression with anxiety   Assessment & Plan     · Depression increased recently given her late 's birthday was three days ago  · Continue Remeron  Consider geriatrics consult for depression if needed       Essential hypertension   Assessment & Plan     · Unclear if she was taking her lisinopril  · Monitor   Adjust if persistently hypertensive         VTE Prophylaxis: Enoxaparin (Lovenox)  / sequential compression device   Anticipated Length of Stay:  Patient will be admitted on an Observation basis with an anticipated length of stay of  > 2 midnights     Justification for Hospital Stay:  Weakness and elevated troponin  Admission Orders:  Scheduled Meds:   Current Facility-Administered Medications:  acetaminophen 650 mg Oral Q6H PRN Davi Filiberto, PA-C   aspirin 81 mg Oral Daily Davi Filiberto, PA-C   calcium carbonate 1,000 mg Oral Daily PRN Davi Filiberto, PA-C   docusate sodium 100 mg Oral BID Davi De Los Santos, PA-C   heparin (porcine) 5,000 Units Subcutaneous [facility] Davi De Los Santos, PA-C   levothyroxine 75 mcg Oral Early Morning Davi Filiberto, PA-C   lisinopril 10 mg Oral Daily Davi Filiberto, PA-C   mirtazapine 30 mg Oral HS Davi Filiberto, PA-C   nitroglycerin 0 4 mg Sublingual Q5 Min PRN Davi Filiberto, PA-C   ondansetron 4 mg Intravenous Q6H PRN Davi Filiberto, PA-C   senna 1 tablet Oral Daily Davi Filiberto, PA-C     ECHO: pending  Consult PT/OT  Consult Gerontology  TELM  Consult Nutrition Services  [facility]

## 2019-01-10 VITALS
DIASTOLIC BLOOD PRESSURE: 61 MMHG | BODY MASS INDEX: 23.91 KG/M2 | OXYGEN SATURATION: 96 % | WEIGHT: 134.92 LBS | HEIGHT: 63 IN | SYSTOLIC BLOOD PRESSURE: 133 MMHG | TEMPERATURE: 97.8 F | HEART RATE: 66 BPM | RESPIRATION RATE: 16 BRPM

## 2019-01-10 LAB — MRSA NOSE QL CULT: NORMAL

## 2019-01-10 PROCEDURE — 99217 PR OBSERVATION CARE DISCHARGE MANAGEMENT: CPT | Performed by: NURSE PRACTITIONER

## 2019-01-10 RX ADMIN — HEPARIN SODIUM 5000 UNITS: 5000 INJECTION INTRAVENOUS; SUBCUTANEOUS at 05:32

## 2019-01-10 RX ADMIN — DOCUSATE SODIUM 100 MG: 100 CAPSULE, LIQUID FILLED ORAL at 08:41

## 2019-01-10 RX ADMIN — LEVOTHYROXINE SODIUM 75 MCG: 75 TABLET ORAL at 05:32

## 2019-01-10 RX ADMIN — SENNOSIDES 8.6 MG: 8.6 TABLET, FILM COATED ORAL at 08:41

## 2019-01-10 RX ADMIN — ASPIRIN 81 MG: 81 TABLET, COATED ORAL at 08:41

## 2019-01-10 RX ADMIN — LISINOPRIL 10 MG: 10 TABLET ORAL at 08:41

## 2019-01-10 NOTE — ASSESSMENT & PLAN NOTE
· Patient without cardiac complaints but overall feels weak and terrible which has been chronic per patient's family  · Possible type 2 from elevated BP  · Troponins remained slightly elevated but did not peak   · Echocardiogram was within normal limits and remarkable for a 80year-old female

## 2019-01-10 NOTE — DISCHARGE SUMMARY
Discharge- Oz Comment 10/30/1923, 80 y o  female MRN: [id number]    Unit/Bed#: CW2 210-01 Encounter: [id number]    Primary Care Provider: Hermelinda Peters MD   Date and time admitted to hospital: 1/8/2019  2:30 PM        * NSTEMI (non-ST elevated myocardial infarction) [facility])   Assessment & Plan    · Patient without cardiac complaints but overall feels weak and terrible which has been chronic per patient's family  · Possible type 2 from elevated BP  · Troponins remained slightly elevated but did not peak   · Echocardiogram was within normal limits and remarkable for a 80year-old female     Stage 3 chronic kidney disease ([facility] [address] )   Assessment & Plan    · Creatinine at baseline  Monitor     Generalized weakness   Assessment & Plan    · Probably multifactorial given advanced age, deconditioning, depression, doubt cardiac event  · PT/OT evals done and I think patient is stable to go back to her assisted living       Depression with anxiety   Assessment & Plan    · Depression increased recently given her late 's birthday was three days ago  · Continue Remeron  · Geriatrics has been consulted and feel this is largely contributing to the patient's generalized weakness and symptoms  Discharging Physician / Practitioner: Orville Gonzales  PCP: Hermelinda Peters MD  Admission Date:   Admission Orders     Ordered        01/08/19 1626  Place in Observation (expected length of stay for this patient is less than two midnights)  Once             Discharge Date: 01/10/19    Resolved Problems  Date Reviewed: 1/9/2019    None          Consultations During Hospital Stay:  Geriatrics    Procedures Performed:   · Chest x-ray no acute disease    Significant Findings / Test Results:   · None    Incidental Findings:   · None     Test Results Pending at Discharge (will require follow up):    · None     Outpatient Tests Requested:  · None    Complications:  None    Reason for Admission:  Weakness and general complaints    Hospital Course:     Maggie Alvarado is a 80 y o  female patient who originally presented to the hospital on 1/8/2019 due to weakness  Patient is a 35-year-old who has a history of hypertension and her blood pressure was noted to be elevated upon admission  Troponins were checked and remained elevated at 0 13 however the patient had no cardiac complaints  She does complain of generalized weakness and having no appetite and after discussion with her family this has been ongoing of present for several weeks  She lives in assisted living and lost her  several years ago and has had a general decline  Patient herself cannot pinpoint any symptoms that bother her today  We did an echocardiogram and her ejection fraction is 65% with no valvular abnormalities  There be no further inpatient workup for this patient and given that she is depressed, perhaps she could see Geriatrics as an outpatient  Please see above list of diagnoses and related plan for additional information  Condition at Discharge: stable     Discharge Day Visit / Exam:     Subjective:  Still feels weak but does not have any specific complaints  No nausea or vomiting however has no appetite  No fever or chills  No pain  Vitals: Blood Pressure: 133/61 (01/10/19 1000)  Pulse: 66 (01/10/19 0752)  Temperature: 97 8 °F (36 6 °C) (01/10/19 0752)  Temp Source: Oral (01/10/19 0752)  Respirations: 16 (01/10/19 0752)  Height: 5' 3" (160 cm) (01/08/19 1900)  Weight - Scale: 61 2 kg (134 lb 14 7 oz) (01/10/19 0600)  SpO2: 96 % (01/10/19 0752)  Exam:   Physical Exam   Constitutional: She is oriented to person, place, and time  She appears well-nourished  No distress  HENT:   Head: Normocephalic  Eyes: Pupils are equal, round, and reactive to light  Neck: Normal range of motion  Neck supple  Cardiovascular: Normal rate, regular rhythm and normal heart sounds      Pulmonary/Chest: Effort normal and breath sounds normal  No respiratory distress  Abdominal: Soft  Bowel sounds are normal    Musculoskeletal: Normal range of motion  She exhibits no edema  Neurological: She is oriented to person, place, and time  Skin: Skin is warm and dry  Psychiatric: She has a normal mood and affect  Her behavior is normal  Thought content normal    Nursing note and vitals reviewed  Discussion with Family:  Discussed with daughter    Discharge instructions/Information to patient and family:   See after visit summary for information provided to patient and family  Provisions for Follow-Up Care:  See after visit summary for information related to follow-up care and any pertinent home health orders  Disposition:     Assisted Living Facility at [facility] assisted with    For Discharges to [facility] SNF:   · Not Applicable to this Patient - Not Applicable to this Patient    Planned Readmission:  Not anticipated     Discharge Statement:  I spent 35 minutes discharging the patient  This time was spent on the day of discharge  I had direct contact with the patient on the day of discharge  Greater than 50% of the total time was spent examining patient, answering all patient questions, arranging and discussing plan of care with patient as well as directly providing post-discharge instructions  Additional time then spent on discharge activities  Discharge Medications:  See after visit summary for reconciled discharge medications provided to patient and family        ** Please Note: This note has been constructed using a voice recognition system **

## 2019-01-10 NOTE — ASSESSMENT & PLAN NOTE
· Probably multifactorial given advanced age, deconditioning, depression, doubt cardiac event  · PT/OT evals done and I think patient is stable to go back to her assisted living

## 2019-01-10 NOTE — UTILIZATION REVIEW
[address]  Utilization Review Department  Phone: [phone]; Fax [phone]  [email or web address]  ATTENTION: Please call with any questions or concerns to [phone]  and carefully listen to the prompts so that you are directed to the right person  Send all requests for admission clinical reviews, approved or denied determinations and any other requests to fax [phone]   All voicemails are confidential     =========================================================================    Continued Stay Review    Date: 01/10/2019  Vital Signs: BP (!) 183/79 (BP Location: Right arm)   Pulse 66   Temp 97 8 °F (36 6 °C) (Oral)   Resp 16   Ht 5' 3" (1 6 m)   Wt 61 2 kg (134 lb 14 7 oz)   SpO2 96%   BMI 23 90 kg/m²   Assessment/Plan: 0858  Medications:   Scheduled Meds:   Current Facility-Administered Medications:  acetaminophen 650 mg Oral Q6H PRN Antonio Roche, PA-C   aspirin 81 mg Oral Daily Antonio Roche, PA-C   calcium carbonate 1,000 mg Oral Daily PRN Antonio Roche, PA-C   docusate sodium 100 mg Oral BID [place] Roche, PA-C   heparin (porcine) 5,000 Units Subcutaneous [facility] Antonio Roche, PA-C   levothyroxine 75 mcg Oral Early Morning Antonio Roche, PA-C   lisinopril 10 mg Oral Daily Antonio Roche, PA-C   mirtazapine 30 mg Oral HS Antonio Roche, PA-C   nitroglycerin 0 4 mg Sublingual Q5 Min PRN [place] Roche, PA-C   ondansetron 4 mg Intravenous Q6H PRN [place] Roche, PA-C   senna 1 tablet Oral Daily Antonio Roche, PA-C   Pertinent Labs/Diagnostic Results:   Age/Sex: 80 y o  female   Discharge Plan:   01/10/19 1006  Discharge patient Once     Discharge Disposition: Home/Self Care    Expected Discharge Time: Midday    Expected Discharge Date: 01/10/19

## 2019-01-11 ENCOUNTER — APPOINTMENT (EMERGENCY)
Dept: NON INVASIVE DIAGNOSTICS | Facility: HOSPITAL | Age: 84
End: 2019-01-11
Payer: COMMERCIAL

## 2019-01-11 ENCOUNTER — HOSPITAL ENCOUNTER (OUTPATIENT)
Facility: HOSPITAL | Age: 84
Setting detail: OBSERVATION
Discharge: HOSPICE | End: 2019-01-15
Attending: EMERGENCY MEDICINE | Admitting: INTERNAL MEDICINE
Payer: COMMERCIAL

## 2019-01-11 ENCOUNTER — APPOINTMENT (EMERGENCY)
Dept: RADIOLOGY | Facility: HOSPITAL | Age: 84
End: 2019-01-11
Payer: COMMERCIAL

## 2019-01-11 DIAGNOSIS — G93.40 ENCEPHALOPATHY: Primary | ICD-10-CM

## 2019-01-11 DIAGNOSIS — T68.XXXA HYPOTHERMIA: ICD-10-CM

## 2019-01-11 DIAGNOSIS — N17.9 AKI (ACUTE KIDNEY INJURY) (HCC): ICD-10-CM

## 2019-01-11 DIAGNOSIS — K59.00 CONSTIPATION: ICD-10-CM

## 2019-01-11 DIAGNOSIS — F41.8 DEPRESSION WITH ANXIETY: ICD-10-CM

## 2019-01-11 DIAGNOSIS — R62.7 FAILURE TO THRIVE IN ADULT: ICD-10-CM

## 2019-01-11 DIAGNOSIS — R53.81 PHYSICAL DECONDITIONING: ICD-10-CM

## 2019-01-11 PROBLEM — R41.82 ALTERED MENTAL STATUS: Status: ACTIVE | Noted: 2019-01-11

## 2019-01-11 PROBLEM — R41.89 UNRESPONSIVE EPISODE: Status: ACTIVE | Noted: 2019-01-11

## 2019-01-11 LAB
ALBUMIN SERPL BCP-MCNC: 3.4 G/DL (ref 3.5–5)
ALP SERPL-CCNC: 66 U/L (ref 46–116)
ALT SERPL W P-5'-P-CCNC: 17 U/L (ref 12–78)
ANION GAP SERPL CALCULATED.3IONS-SCNC: 7 MMOL/L (ref 4–13)
AST SERPL W P-5'-P-CCNC: 27 U/L (ref 5–45)
ATRIAL RATE: 394 BPM
BACTERIA UR QL AUTO: NORMAL /HPF
BASOPHILS # BLD AUTO: 0.02 THOUSANDS/ΜL (ref 0–0.1)
BASOPHILS NFR BLD AUTO: 0 % (ref 0–1)
BILIRUB SERPL-MCNC: 0.34 MG/DL (ref 0.2–1)
BILIRUB UR QL STRIP: ABNORMAL
BUN SERPL-MCNC: 33 MG/DL (ref 5–25)
CALCIUM SERPL-MCNC: 10.8 MG/DL (ref 8.3–10.1)
CHLORIDE SERPL-SCNC: 109 MMOL/L (ref 100–108)
CLARITY UR: ABNORMAL
CO2 SERPL-SCNC: 24 MMOL/L (ref 21–32)
COLOR UR: YELLOW
CORTIS SERPL-MCNC: 10.4 UG/DL
CREAT SERPL-MCNC: 1.38 MG/DL (ref 0.6–1.3)
EOSINOPHIL # BLD AUTO: 0.17 THOUSAND/ΜL (ref 0–0.61)
EOSINOPHIL NFR BLD AUTO: 3 % (ref 0–6)
ERYTHROCYTE [DISTWIDTH] IN BLOOD BY AUTOMATED COUNT: 13.2 % (ref 11.6–15.1)
GFR SERPL CREATININE-BSD FRML MDRD: 33 ML/MIN/1.73SQ M
GLUCOSE SERPL-MCNC: 81 MG/DL (ref 65–140)
GLUCOSE UR STRIP-MCNC: NEGATIVE MG/DL
HCT VFR BLD AUTO: 41 % (ref 34.8–46.1)
HGB BLD-MCNC: 12.9 G/DL (ref 11.5–15.4)
HGB UR QL STRIP.AUTO: ABNORMAL
HYALINE CASTS #/AREA URNS LPF: NORMAL /LPF
IMM GRANULOCYTES # BLD AUTO: 0.01 THOUSAND/UL (ref 0–0.2)
IMM GRANULOCYTES NFR BLD AUTO: 0 % (ref 0–2)
KETONES UR STRIP-MCNC: ABNORMAL MG/DL
LEUKOCYTE ESTERASE UR QL STRIP: ABNORMAL
LYMPHOCYTES # BLD AUTO: 1.62 THOUSANDS/ΜL (ref 0.6–4.47)
LYMPHOCYTES NFR BLD AUTO: 28 % (ref 14–44)
MCH RBC QN AUTO: 30.9 PG (ref 26.8–34.3)
MCHC RBC AUTO-ENTMCNC: 31.5 G/DL (ref 31.4–37.4)
MCV RBC AUTO: 98 FL (ref 82–98)
MONOCYTES # BLD AUTO: 0.61 THOUSAND/ΜL (ref 0.17–1.22)
MONOCYTES NFR BLD AUTO: 10 % (ref 4–12)
NEUTROPHILS # BLD AUTO: 3.44 THOUSANDS/ΜL (ref 1.85–7.62)
NEUTS SEG NFR BLD AUTO: 59 % (ref 43–75)
NITRITE UR QL STRIP: NEGATIVE
NON-SQ EPI CELLS URNS QL MICRO: NORMAL /HPF
NRBC BLD AUTO-RTO: 0 /100 WBCS
PH UR STRIP.AUTO: 5.5 [PH] (ref 4.5–8)
PLATELET # BLD AUTO: 144 THOUSANDS/UL (ref 149–390)
PLATELET # BLD AUTO: 157 THOUSANDS/UL (ref 149–390)
PMV BLD AUTO: 10.8 FL (ref 8.9–12.7)
PMV BLD AUTO: 11.6 FL (ref 8.9–12.7)
POTASSIUM SERPL-SCNC: 4.6 MMOL/L (ref 3.5–5.3)
PROCALCITONIN SERPL-MCNC: <0.05 NG/ML
PROT SERPL-MCNC: 6.6 G/DL (ref 6.4–8.2)
PROT UR STRIP-MCNC: NEGATIVE MG/DL
QRS AXIS: -37 DEGREES
QRSD INTERVAL: 72 MS
QT INTERVAL: 416 MS
QTC INTERVAL: 408 MS
RBC # BLD AUTO: 4.18 MILLION/UL (ref 3.81–5.12)
RBC #/AREA URNS AUTO: NORMAL /HPF
SODIUM SERPL-SCNC: 140 MMOL/L (ref 136–145)
SP GR UR STRIP.AUTO: 1.02 (ref 1–1.03)
T WAVE AXIS: 68 DEGREES
TROPONIN I SERPL-MCNC: 0.1 NG/ML
TROPONIN I SERPL-MCNC: 0.11 NG/ML
TSH SERPL DL<=0.05 MIU/L-ACNC: 0.85 UIU/ML (ref 0.36–3.74)
UROBILINOGEN UR QL STRIP.AUTO: 0.2 E.U./DL
VENTRICULAR RATE: 58 BPM
WBC # BLD AUTO: 5.87 THOUSAND/UL (ref 4.31–10.16)
WBC #/AREA URNS AUTO: NORMAL /HPF

## 2019-01-11 PROCEDURE — 84484 ASSAY OF TROPONIN QUANT: CPT | Performed by: EMERGENCY MEDICINE

## 2019-01-11 PROCEDURE — 36415 COLL VENOUS BLD VENIPUNCTURE: CPT | Performed by: EMERGENCY MEDICINE

## 2019-01-11 PROCEDURE — 81001 URINALYSIS AUTO W/SCOPE: CPT

## 2019-01-11 PROCEDURE — 99223 1ST HOSP IP/OBS HIGH 75: CPT | Performed by: HOSPITALIST

## 2019-01-11 PROCEDURE — 99285 EMERGENCY DEPT VISIT HI MDM: CPT

## 2019-01-11 PROCEDURE — 85025 COMPLETE CBC W/AUTO DIFF WBC: CPT | Performed by: EMERGENCY MEDICINE

## 2019-01-11 PROCEDURE — 71046 X-RAY EXAM CHEST 2 VIEWS: CPT

## 2019-01-11 PROCEDURE — 84484 ASSAY OF TROPONIN QUANT: CPT | Performed by: PHYSICIAN ASSISTANT

## 2019-01-11 PROCEDURE — 93970 EXTREMITY STUDY: CPT | Performed by: SURGERY

## 2019-01-11 PROCEDURE — 96360 HYDRATION IV INFUSION INIT: CPT

## 2019-01-11 PROCEDURE — 84145 PROCALCITONIN (PCT): CPT | Performed by: PHYSICIAN ASSISTANT

## 2019-01-11 PROCEDURE — 93010 ELECTROCARDIOGRAM REPORT: CPT | Performed by: INTERNAL MEDICINE

## 2019-01-11 PROCEDURE — 80053 COMPREHEN METABOLIC PANEL: CPT | Performed by: EMERGENCY MEDICINE

## 2019-01-11 PROCEDURE — 93005 ELECTROCARDIOGRAM TRACING: CPT

## 2019-01-11 PROCEDURE — 96361 HYDRATE IV INFUSION ADD-ON: CPT

## 2019-01-11 PROCEDURE — 93970 EXTREMITY STUDY: CPT

## 2019-01-11 PROCEDURE — 85049 AUTOMATED PLATELET COUNT: CPT | Performed by: PHYSICIAN ASSISTANT

## 2019-01-11 PROCEDURE — 70450 CT HEAD/BRAIN W/O DYE: CPT

## 2019-01-11 PROCEDURE — 82533 TOTAL CORTISOL: CPT | Performed by: PHYSICIAN ASSISTANT

## 2019-01-11 PROCEDURE — 84443 ASSAY THYROID STIM HORMONE: CPT | Performed by: EMERGENCY MEDICINE

## 2019-01-11 RX ORDER — MIRTAZAPINE 30 MG/1
30 TABLET, FILM COATED ORAL
Status: DISCONTINUED | OUTPATIENT
Start: 2019-01-11 | End: 2019-01-15 | Stop reason: HOSPADM

## 2019-01-11 RX ORDER — LISINOPRIL 10 MG/1
10 TABLET ORAL DAILY
Status: DISCONTINUED | OUTPATIENT
Start: 2019-01-12 | End: 2019-01-12

## 2019-01-11 RX ORDER — ASPIRIN 81 MG/1
81 TABLET ORAL DAILY
Status: DISCONTINUED | OUTPATIENT
Start: 2019-01-12 | End: 2019-01-12

## 2019-01-11 RX ORDER — ACETAMINOPHEN 325 MG/1
650 TABLET ORAL EVERY 6 HOURS PRN
Status: DISCONTINUED | OUTPATIENT
Start: 2019-01-11 | End: 2019-01-15 | Stop reason: HOSPADM

## 2019-01-11 RX ORDER — LEVOTHYROXINE SODIUM 0.07 MG/1
75 TABLET ORAL
Status: DISCONTINUED | OUTPATIENT
Start: 2019-01-12 | End: 2019-01-12

## 2019-01-11 RX ORDER — ONDANSETRON 2 MG/ML
4 INJECTION INTRAMUSCULAR; INTRAVENOUS EVERY 6 HOURS PRN
Status: DISCONTINUED | OUTPATIENT
Start: 2019-01-11 | End: 2019-01-15 | Stop reason: HOSPADM

## 2019-01-11 RX ORDER — CALCIUM CARBONATE 200(500)MG
1000 TABLET,CHEWABLE ORAL DAILY PRN
Status: DISCONTINUED | OUTPATIENT
Start: 2019-01-11 | End: 2019-01-15 | Stop reason: HOSPADM

## 2019-01-11 RX ORDER — HEPARIN SODIUM 5000 [USP'U]/ML
5000 INJECTION, SOLUTION INTRAVENOUS; SUBCUTANEOUS EVERY 8 HOURS SCHEDULED
Status: DISCONTINUED | OUTPATIENT
Start: 2019-01-11 | End: 2019-01-12

## 2019-01-11 RX ORDER — DOCUSATE SODIUM 100 MG/1
100 CAPSULE, LIQUID FILLED ORAL 2 TIMES DAILY
Status: DISCONTINUED | OUTPATIENT
Start: 2019-01-11 | End: 2019-01-15 | Stop reason: HOSPADM

## 2019-01-11 RX ADMIN — HEPARIN SODIUM 5000 UNITS: 5000 INJECTION INTRAVENOUS; SUBCUTANEOUS at 22:01

## 2019-01-11 RX ADMIN — SODIUM CHLORIDE 1000 ML: 0.9 INJECTION, SOLUTION INTRAVENOUS at 14:00

## 2019-01-11 NOTE — ED NOTES
Resident at the bedside speaking with the family  Family would like to have the pt admitted        Trenton Sotelo RN  01/11/19 9271

## 2019-01-11 NOTE — ASSESSMENT & PLAN NOTE
· Possibly due to advanced age, frailty, and poor nutritional status  · Rule out occult infection   Check blood cultures and procalcitonin  · Check cortisol  · TSH within normal limits  · [employer] [facility]

## 2019-01-11 NOTE — H&P
H&P- Massimo Armenta 10/30/1923, 80 y o  female MRN: [id number]    Unit/Bed#: ED 13 Encounter: [id number]    Primary Care Provider: Krystal White MD   Date and time admitted to hospital: 1/11/2019 12:54 PM        * Hypothermia   Assessment & Plan    · Possibly due to advanced age, frailty, and poor nutritional status  · Rule out occult infection  Check blood cultures and procalcitonin  · Check cortisol  · TSH within normal limits  · Jelena Bradley     Unresponsive episode   Assessment & Plan    · Possibly due to hypothermia  Also consider cardiac arrhythmia or neurologic cause  · Maintain telemetry to rule out arrhythmia     Stage 3 chronic kidney disease (HCC)   Assessment & Plan    · Creatinine overall at baseline  · S/p 1L fluids in ED     NSTEMI (non-ST elevated myocardial infarction) [facility])   Assessment & Plan    · Troponin trending down from last admission  Echo done a few days ago was unremarkable  Suspected due to accelerated HTN in the setting of renal insufficiency  Trend troponin  Overall trending down from last admission     Depression with anxiety   Assessment & Plan    · Seen by geriatrics last admission who recommended adding Zoloft 25 mg daily to patient's Remeron but patient declined     Essential hypertension   Assessment & Plan    · On lisinopril       VTE Prophylaxis: Heparin  / sequential compression device   Code Status: DNR/DNI  POLST: There is no POLST form on file for this patient (pre-hospital)  Discussion with family: Family at bedside     Anticipated Length of Stay:  Patient will be admitted on an Observation basis with an anticipated length of stay of  < 2 midnights  Justification for Hospital Stay: Hypothermia, altered mental status     Total Time for Visit, including Counseling / Coordination of Care: 45 minutes    Greater than 50% of this total time spent on direct patient counseling and coordination of care      Chief Complaint:   Unresponsive episode     History of Present Illness:     Swati Saavedra is a 80 y o  female with a history of HTN, hypothyroidism, depression, and CKD who presents with unresponsive episode at assisted living facility  Patient was actually just discharged from the hospital yesterday  She was admitted from 19-19 for generalized weakness and found to have a mildly elevated troponin in the setting of renal failure and accelerated hypertension  Echo was unremarkable and patient was discharged back to facility  She was also noted to be more depressed than normal given late 's birthday was a few days prior  She was seen by geriatrics who recommended addition of Zoloft but patient refused this  Her family states she was doing remarkably well last evening and was even ambulating without a walker  This morning, she wasn't feeling good and they facility brought her breakfast to her room  They went to check on her for lunch and she was noted to be unresponsive and would not wake up to sternal rub  It is unclear how long she was unresponsive for  In the ED she was found to be hypothermic and placed on [employer]  She is now awake and alert  She denies complaints other than pain from left knee to left ankle which has been present since last admission  She is tearful and states that she is ready to be with her  (who is )       Review of Systems:     Review of Systems   Constitutional: Negative  HENT: Negative  Eyes: Negative  Respiratory: Negative  Cardiovascular: Negative  Gastrointestinal: Negative  Endocrine: Negative  Genitourinary: Negative  Musculoskeletal:        Pain from left knee to ankle   Skin: Negative  Allergic/Immunologic: Negative  Neurological: Negative  Hematological: Negative      Psychiatric/Behavioral: Positive for dysphoric mood          Past Medical and Surgical History:      Medical History        Past Medical History:   Diagnosis Date    Depression with anxiety      Hypertension    Hypothyroidism              Surgical History         Past Surgical History:   Procedure Laterality Date    CATARACT EXTRACTION        CHOLECYSTECTOMY                Meds/Allergies:             Prior to Admission medications    Medication Sig Start Date End Date Taking? Authorizing Provider   aspirin (ASPIR-81) 81 mg EC tablet Take 1 tablet by mouth daily 9/18/13   Yes Historical Provider, MD   carbamide peroxide (DEBROX) 6 5 % otic solution Administer 5 drops into both ears 2 (two) times a day for 4 days 10/10/18 1/11/19 Yes Master Sosa MD   levothyroxine 75 mcg tablet TAKE 1 TABLET DAILY  11/12/18   Yes Master Sosa MD   lisinopril (ZESTRIL) 10 mg tablet TAKE 1 TABLET DAILY AS DIRECTED 11/12/18   Yes Master Sosa MD   mirtazapine (REMERON) 30 mg tablet TAKE 1 TABLET DAILY AT BEDTIME 11/21/18   Yes Master Sosa MD   multivitamin [facility]) TABS Take 1 tablet by mouth daily     Yes Historical Provider, MD      I have reviewed home medications with a medical source (PCP, Pharmacy, other)      Allergies: Allergies   Allergen Reactions    Hydrochlorothiazide           Social History:     Marital Status:     Occupation: None  Patient Pre-hospital Living Situation: Assisted living  Patient Pre-hospital Level of Mobility: Uses assistive devices  Patient Pre-hospital Diet Restrictions: None  Substance Use History:       History   Alcohol Use No          History   Smoking Status    Former Smoker   Smokeless Tobacco    Never Used          History   Drug Use No         Family History:     non-contributory     Physical Exam:      Vitals:   Blood Pressure: 106/62 (01/11/19 1802)  Pulse: 66 (01/11/19 1802)  Temperature: (!) 94 4 °F (34 7 °C) (01/11/19 1610)  Temp Source: Rectal (01/11/19 1610)  Respirations: 18 (01/11/19 1802)  Height: 5' 3" (160 cm) (01/11/19 1320)  Weight - Scale: 61 2 kg (134 lb 14 7 oz) (01/11/19 1320)  SpO2: 96 % (01/11/19 1802)     Physical Exam   Constitutional: She is oriented to person, place, and time  Frail   HENT:   Head: Normocephalic and atraumatic  Eyes: EOM are normal    Neck: Neck supple  Cardiovascular: Normal rate, regular rhythm and normal heart sounds  Pulmonary/Chest: Effort normal and breath sounds normal  No respiratory distress  She has no wheezes  She has no rales  Abdominal: Soft  Bowel sounds are normal  She exhibits no distension  There is no tenderness  There is no rebound  Musculoskeletal:   Lower extremity edema B/L  Left ankle pain   Neurological: She is alert and oriented to person, place, and time  Good strength in all extremities  Face midline  Tongue symmetrical  Speech clear   Skin: Skin is warm and dry  She is not diaphoretic     Lower extremities with dry skin   Psychiatric:   Tearful at times            Additional Data:      Lab Results: I have personally reviewed pertinent reports           Results from last 7 days  Lab Units 01/11/19  1357   WBC Thousand/uL 5 87   HEMOGLOBIN g/dL 12 9   HEMATOCRIT % 41 0   PLATELETS Thousands/uL 157   NEUTROS PCT % 59   LYMPHS PCT % 28   MONOS PCT % 10   EOS PCT % 3         Results from last 7 days  Lab Units 01/11/19  1357   SODIUM mmol/L 140   POTASSIUM mmol/L 4 6   CHLORIDE mmol/L 109*   CO2 mmol/L 24   BUN mg/dL 33*   CREATININE mg/dL 1 38*   ANION GAP mmol/L 7   CALCIUM mg/dL 10 8*   ALBUMIN g/dL 3 4*   TOTAL BILIRUBIN mg/dL 0 34   ALK PHOS U/L 66   ALT U/L 17   AST U/L 27   GLUCOSE RANDOM mg/dL 81                         Imaging: I have personally reviewed pertinent reports        X-ray chest 2 views   Final Result by Shima Galvez DO (01/11 1548)       No acute cardiopulmonary disease                Workstation performed: [id number]           CT head wo contrast   Final Result by Anabel Porter MD (01/11 9009)       No acute intracranial hemorrhage seen   No mass effect or midline shift   Mild periventricular and white matter hypodensity seen related to chronic small vessel ischemic changes                     Workstation performed: [id number]           VAS lower limb venous duplex study, complete bilateral    (Results Pending)         EKG, Pathology, and Other Studies Reviewed on Admission:   · EKG: Read as a fib with slow ventricular response but looks like sinus ashvin vs possible a flutter? Will continue telemetry monitoring     Allscripts / Epic Records Reviewed: Yes      ** Please Note: This note has been constructed using a voice recognition system   **

## 2019-01-11 NOTE — ASSESSMENT & PLAN NOTE
· Possibly due to hypothermia   Also consider cardiac arrhythmia or neurologic cause  · Maintain telemetry to rule out arrhythmia

## 2019-01-11 NOTE — ASSESSMENT & PLAN NOTE
· Possibly due to advanced age, frailty, and poor nutritional status  · Rule out occult infection   Check blood cultures and procalcitonin  · Check cortisol  · TSH within normal limits  · [employer] [facility]

## 2019-01-11 NOTE — ED NOTES
Resident notified of the current temp  Family at the bedside waiting to speak with the resident        Cash Vasques RN  01/11/19 9721

## 2019-01-11 NOTE — ASSESSMENT & PLAN NOTE
· Troponin trending down from last admission  Echo done a few days ago was unremarkable  Suspected due to accelerated HTN in the setting of renal insufficiency  Trend troponin   Overall trending down from last admission

## 2019-01-11 NOTE — PROGRESS NOTES
Progress Note - Jose Daniel Lewis 10/30/1923, 80 y o  female MRN: [id number]    Unit/Bed#: ED 13 Encounter: [id number]    Primary Care Provider: Christopher Patel MD   Date and time admitted to hospital: 1/11/2019 12:54 PM    * Hypothermia   Assessment & Plan    · Possibly due to advanced age, frailty, and poor nutritional status  · Rule out occult infection  Check blood cultures and procalcitonin  · Check cortisol  · TSH within normal limits  · Jelena Hugger     Unresponsive episode   Assessment & Plan    · Possibly due to hypothermia  Also consider cardiac arrhythmia or neurologic cause  · Maintain telemetry to rule out arrhythmia     Stage 3 chronic kidney disease (HCC)   Assessment & Plan    · Creatinine overall at baseline  · S/p 1L fluids in ED     NSTEMI (non-ST elevated myocardial infarction) [facility])   Assessment & Plan    · Troponin trending down from last admission  Echo done a few days ago was unremarkable  Suspected due to accelerated HTN in the setting of renal insufficiency  Trend troponin  Overall trending down from last admission     Depression with anxiety   Assessment & Plan    · Seen by geriatrics last admission who recommended adding Zoloft 25 mg daily to patient's Remeron but patient declined     Essential hypertension   Assessment & Plan    · On lisinopril       VTE Prophylaxis: Heparin  / sequential compression device   Code Status: DNR/DNI  POLST: There is no POLST form on file for this patient (pre-hospital)  Discussion with family: Family at bedside    Anticipated Length of Stay:  Patient will be admitted on an Observation basis with an anticipated length of stay of  < 2 midnights  Justification for Hospital Stay: Hypothermia, altered mental status    Total Time for Visit, including Counseling / Coordination of Care: 45 minutes  Greater than 50% of this total time spent on direct patient counseling and coordination of care      Chief Complaint:   Unresponsive episode    History of Present Illness:    Andre Cortez is a 80 y o  female with a history of HTN, hypothyroidism, depression, and CKD who presents with unresponsive episode at assisted living facility  Patient was actually just discharged from the hospital yesterday  She was admitted from 19-19 for generalized weakness and found to have a mildly elevated troponin in the setting of renal failure and accelerated hypertension  Echo was unremarkable and patient was discharged back to facility  She was also noted to be more depressed than normal given late 's birthday was a few days prior  She was seen by geriatrics who recommended addition of Zoloft but patient refused this  Her family states she was doing remarkably well last evening and was even ambulating without a walker  This morning, she wasn't feeling good and they facility brought her breakfast to her room  They went to check on her for lunch and she was noted to be unresponsive and would not wake up to sternal rub  It is unclear how long she was unresponsive for  In the ED she was found to be hypothermic and placed on [employer]  She is now awake and alert  She denies complaints other than pain from left knee to left ankle which has been present since last admission  She is tearful and states that she is ready to be with her  (who is )  Review of Systems:    Review of Systems   Constitutional: Negative  HENT: Negative  Eyes: Negative  Respiratory: Negative  Cardiovascular: Negative  Gastrointestinal: Negative  Endocrine: Negative  Genitourinary: Negative  Musculoskeletal:        Pain from left knee to ankle   Skin: Negative  Allergic/Immunologic: Negative  Neurological: Negative  Hematological: Negative  Psychiatric/Behavioral: Positive for dysphoric mood         Past Medical and Surgical History:     Past Medical History:   Diagnosis Date    Depression with anxiety     Hypertension     Hypothyroidism        Past Surgical History:   Procedure Laterality Date    CATARACT EXTRACTION      CHOLECYSTECTOMY         Meds/Allergies:    Prior to Admission medications    Medication Sig Start Date End Date Taking? Authorizing Provider   aspirin (ASPIR-81) 81 mg EC tablet Take 1 tablet by mouth daily 9/18/13  Yes Historical Provider, MD   carbamide peroxide (DEBROX) 6 5 % otic solution Administer 5 drops into both ears 2 (two) times a day for 4 days 10/10/18 1/11/19 Yes Chacho Allen MD   levothyroxine 75 mcg tablet TAKE 1 TABLET DAILY  11/12/18  Yes Chacho Allen MD   lisinopril (ZESTRIL) 10 mg tablet TAKE 1 TABLET DAILY AS DIRECTED 11/12/18  Yes Chacho Allen MD   mirtazapine (REMERON) 30 mg tablet TAKE 1 TABLET DAILY AT BEDTIME 11/21/18  Yes Chacho Allen MD   multivitamin [facility]) TABS Take 1 tablet by mouth daily   Yes Historical Provider, MD     I have reviewed home medications with a medical source (PCP, Pharmacy, other)  Allergies: Allergies   Allergen Reactions    Hydrochlorothiazide        Social History:     Marital Status:    Occupation: None  Patient Pre-hospital Living Situation: Assisted living  Patient Pre-hospital Level of Mobility: Uses assistive devices  Patient Pre-hospital Diet Restrictions: None  Substance Use History:   History   Alcohol Use No     History   Smoking Status    Former Smoker   Smokeless Tobacco    Never Used     History   Drug Use No       Family History:    non-contributory    Physical Exam:     Vitals:   Blood Pressure: 106/62 (01/11/19 1802)  Pulse: 66 (01/11/19 1802)  Temperature: (!) 94 4 °F (34 7 °C) (01/11/19 1610)  Temp Source: Rectal (01/11/19 1610)  Respirations: 18 (01/11/19 1802)  Height: 5' 3" (160 cm) (01/11/19 1320)  Weight - Scale: 61 2 kg (134 lb 14 7 oz) (01/11/19 1320)  SpO2: 96 % (01/11/19 1802)    Physical Exam   Constitutional: She is oriented to person, place, and time  Frail   HENT:   Head: Normocephalic and atraumatic     Eyes: EOM are normal    Neck: Neck supple  Cardiovascular: Normal rate, regular rhythm and normal heart sounds  Pulmonary/Chest: Effort normal and breath sounds normal  No respiratory distress  She has no wheezes  She has no rales  Abdominal: Soft  Bowel sounds are normal  She exhibits no distension  There is no tenderness  There is no rebound  Musculoskeletal:   Lower extremity edema B/L  Left ankle pain   Neurological: She is alert and oriented to person, place, and time  Good strength in all extremities  Face midline  Tongue symmetrical  Speech clear   Skin: Skin is warm and dry  She is not diaphoretic  Lower extremities with dry skin   Psychiatric:   Tearful at times         Additional Data:     Lab Results: I have personally reviewed pertinent reports  Results from last 7 days  Lab Units 01/11/19  1357   WBC Thousand/uL 5 87   HEMOGLOBIN g/dL 12 9   HEMATOCRIT % 41 0   PLATELETS Thousands/uL 157   NEUTROS PCT % 59   LYMPHS PCT % 28   MONOS PCT % 10   EOS PCT % 3       Results from last 7 days  Lab Units 01/11/19  1357   SODIUM mmol/L 140   POTASSIUM mmol/L 4 6   CHLORIDE mmol/L 109*   CO2 mmol/L 24   BUN mg/dL 33*   CREATININE mg/dL 1 38*   ANION GAP mmol/L 7   CALCIUM mg/dL 10 8*   ALBUMIN g/dL 3 4*   TOTAL BILIRUBIN mg/dL 0 34   ALK PHOS U/L 66   ALT U/L 17   AST U/L 27   GLUCOSE RANDOM mg/dL 81                       Imaging: I have personally reviewed pertinent reports  X-ray chest 2 views   Final Result by [facility] DO (01/11 1548)      No acute cardiopulmonary disease              Workstation performed: [id number]         CT head wo contrast   Final Result by Zain Bonilla MD (01/11 8339)      No acute intracranial hemorrhage seen   No mass effect or midline shift   Mild periventricular and white matter hypodensity seen related to chronic small vessel ischemic changes                  Workstation performed: [id number]         VAS lower limb venous duplex study, complete bilateral    (Results Pending) EKG, Pathology, and Other Studies Reviewed on Admission:   · EKG: Read as a fib with slow ventricular response but looks like sinus ashvin vs possible a flutter? Will continue telemetry monitoring    Allscripts / Epic Records Reviewed: Yes     ** Please Note: This note has been constructed using a voice recognition system   **

## 2019-01-11 NOTE — ED ATTENDING ATTESTATION
Aguila Alexander MD, saw and evaluated the patient  I have discussed the patient with the resident/non-physician practitioner and agree with the resident's/non-physician practitioner's findings, Plan of Care, and MDM as documented in the resident's/non-physician practitioner's note, except where noted  All available labs and Radiology studies were reviewed  At this point I agree with the current assessment done in the Emergency Department    I have conducted an independent evaluation of this patient a history and physical is as follows:  Here with altered MS   H/o htn and hypothyroidism   Had NSTEMI   Jan 8   several days ago   No fever  No cough no sob    No falls   Was back to baseline yesterday    Back to assisted living  After her admission   Now less active   Exam nad  ncat neck no jvd  Lungs clear heart rrr no m abd soft  nt nd pos bs ext  tender left calf mild edema b/l   Imp altered ms    Lab work up  Urine      Critical Care Time  CritCare Time    Procedures

## 2019-01-11 NOTE — ED PROVIDER NOTES
History  Chief Complaint   Patient presents with    Altered Mental Status     Pt was recently d/c from the hospital  Pt was seen at breakfast and when looked in on, would not wake up or respond  79 y/o female presents to the ED for AMS  Patient was discharged yesterday after being admitted for NSTEMI 2/2 HTN and generalized weakness  Patient was discharged back to [facility] assisted living  Family reports that they saw the patient yesterday and she was doing well- alert, active, and up walking around  NH reported to family that she was found unresponsive around lunch time  She was last seen normal 2 hours prior  Family reports that the patient is currently at her baseline mental status but seems more tired and generally weak  Patient c/o left calf pain but denies any other complaints  No report of falls or fevers at the [facility]  No other complaints  History provided by:  Patient      Prior to Admission Medications   Prescriptions Last Dose Informant Patient Reported? Taking?   aspirin (ASPIR-81) 81 mg EC tablet   Yes Yes   Sig: Take 1 tablet by mouth daily   carbamide peroxide (DEBROX) 6 5 % otic solution   No Yes   Sig: Administer 5 drops into both ears 2 (two) times a day for 4 days   levothyroxine 75 mcg tablet   No Yes   Sig: TAKE 1 TABLET DAILY     lisinopril (ZESTRIL) 10 mg tablet   No Yes   Sig: TAKE 1 TABLET DAILY AS DIRECTED   mirtazapine (REMERON) 30 mg tablet   No Yes   Sig: TAKE 1 TABLET DAILY AT BEDTIME   multivitamin (THERAGRAN) TABS   Yes No   Sig: Take 1 tablet by mouth daily      Facility-Administered Medications: None       Past Medical History:   Diagnosis Date    Depression with anxiety     Hypertension     Hypothyroidism        Past Surgical History:   Procedure Laterality Date    CATARACT EXTRACTION      CHOLECYSTECTOMY         Family History   Problem Relation Age of Onset    No Known Problems Mother     No Known Problems Father      I have reviewed and agree with the history as documented  Social History   Substance Use Topics    Smoking status: Former Smoker    Smokeless tobacco: Never Used    Alcohol use No        Review of Systems   Constitutional: Negative for chills and fever  HENT: Negative for congestion, ear pain and sore throat  Eyes: Negative for pain and visual disturbance  Respiratory: Negative for cough, shortness of breath and wheezing  Cardiovascular: Negative for chest pain and leg swelling  Gastrointestinal: Negative for abdominal pain, diarrhea, nausea and vomiting  Genitourinary: Negative for dysuria, frequency, hematuria and urgency  Musculoskeletal: Negative for neck pain and neck stiffness  Skin: Negative for rash and wound  Neurological: Negative for weakness, numbness and headaches  Psychiatric/Behavioral: Positive for confusion  Negative for agitation  All other systems reviewed and are negative  Physical Exam  ED Triage Vitals [01/11/19 1320]   Temperature Pulse Respirations Blood Pressure SpO2   (!) 95 5 °F (35 3 °C) 73 22 (!) 183/93 95 %      Temp Source Heart Rate Source Patient Position - Orthostatic VS BP Location FiO2 (%)   Rectal Monitor Lying Right arm --      Pain Score       No Pain           Orthostatic Vital Signs  Vitals:    01/11/19 2000 01/11/19 2204 01/12/19 0804 01/12/19 1149   BP: 119/55 123/61 125/65 130/70   Pulse: 85 86 85 81   Patient Position - Orthostatic VS: Lying Lying Lying Lying       Physical Exam   Constitutional: She is oriented to person, place, and time  She appears well-developed and well-nourished  HENT:   Head: Normocephalic and atraumatic  Mouth/Throat: Oropharynx is clear and moist    Eyes: Pupils are equal, round, and reactive to light  EOM are normal    Neck: Normal range of motion  Neck supple  No midline C,T, or L spine tenderness    Cardiovascular: Normal rate and regular rhythm  Pulmonary/Chest: Effort normal and breath sounds normal  No respiratory distress   She has no wheezes  She has no rales  She exhibits no tenderness  Abdominal: Soft  Bowel sounds are normal  She exhibits no distension  There is no tenderness  Musculoskeletal: Normal range of motion  Tenderness to the left calf  Swelling noted- +1 pitting edema bilaterally   Neurological: She is alert and oriented to person, place, and time  No focal deficits   Skin: Skin is warm and dry  Nursing note and vitals reviewed        ED Medications  Medications   aspirin (ECOTRIN LOW STRENGTH) EC tablet 81 mg (81 mg Oral Not Given 1/12/19 1234)   levothyroxine tablet 75 mcg (75 mcg Oral Not Given 1/12/19 0538)   lisinopril (ZESTRIL) tablet 10 mg (10 mg Oral Not Given 1/12/19 1234)   mirtazapine (REMERON) tablet 30 mg (30 mg Oral Not Given 1/11/19 2201)   docusate sodium (COLACE) capsule 100 mg (100 mg Oral Not Given 1/12/19 1234)   ondansetron (ZOFRAN) injection 4 mg (not administered)   calcium carbonate (TUMS) chewable tablet 1,000 mg (not administered)   heparin (porcine) subcutaneous injection 5,000 Units (5,000 Units Subcutaneous Not Given 1/12/19 0538)   acetaminophen (TYLENOL) tablet 650 mg (not administered)   sodium chloride 0 9 % bolus 1,000 mL (0 mL Intravenous Stopped 1/11/19 1951)       Diagnostic Studies  Results Reviewed     Procedure Component Value Units Date/Time    Cortisol [[id number]] Collected:  01/11/19 2023    Lab Status:  Final result Specimen:  Blood from Arm, Right Updated:  01/11/19 2209     Cortisol, Random 10 4 ug/dL     Procalcitonin [[id number]]  (Normal) Collected:  01/11/19 2023    Lab Status:  Final result Specimen:  Blood from Arm, Right Updated:  01/11/19 2142     Procalcitonin <0 05 ng/ml     Troponin I [[id number]]  (Abnormal) Collected:  01/11/19 2023    Lab Status:  Final result Specimen:  Blood from Arm, Right Updated:  01/11/19 2111     Troponin I 0 11 (H) ng/mL     Troponin I [[id number]]     Lab Status:  No result Specimen:  Blood     Blood culture [[id number]]     Lab Status:  No result Specimen:  Blood     Blood culture [[id number]]     Lab Status:  No result Specimen:  Blood     TSH, 3rd generation with Free T4 reflex [[id number]]  (Normal) Collected:  01/11/19 1358    Lab Status:  Final result Specimen:  Blood from Arm, Left Updated:  01/11/19 1449     TSH 3RD GENERATON 0 847 uIU/mL     Narrative:         Patients undergoing fluorescein dye angiography may retain small amounts of fluorescein in the body for 48-72 hours post procedure  Samples containing fluorescein can produce falsely depressed TSH values  If the patient had this procedure,a specimen should be resubmitted post fluorescein clearance  The recommended reference ranges for TSH during pregnancy are as follows:  First trimester 0 1 to 2 5 uIU/mL  Second trimester  0 2 to 3 0 uIU/mL  Third trimester 0 3 to 3 0 uIU/m      Troponin I [[id number]]  (Abnormal) Collected:  01/11/19 1357    Lab Status:  Final result Specimen:  Blood from Arm, Left Updated:  01/11/19 1429     Troponin I 0 10 (H) ng/mL     Comprehensive metabolic panel [[id number]]  (Abnormal) Collected:  01/11/19 1357    Lab Status:  Final result Specimen:  Blood from Arm, Left Updated:  01/11/19 1428     Sodium 140 mmol/L      Potassium 4 6 mmol/L      Chloride 109 (H) mmol/L      CO2 24 mmol/L      ANION GAP 7 mmol/L      BUN 33 (H) mg/dL      Creatinine 1 38 (H) mg/dL      Glucose 81 mg/dL      Calcium 10 8 (H) mg/dL      AST 27 U/L      ALT 17 U/L      Alkaline Phosphatase 66 U/L      Total Protein 6 6 g/dL      Albumin 3 4 (L) g/dL      Total Bilirubin 0 34 mg/dL      eGFR 33 ml/min/1 73sq m     Narrative:         National Kidney Disease Education Program recommendations are as follows:  GFR calculation is accurate only with a steady state creatinine  Chronic Kidney disease less than 60 ml/min/1 73 sq  meters  Kidney failure less than 15 ml/min/1 73 sq  meters      CBC and differential [[id number]] Collected:  01/11/19 1357    Lab Status:  Final result Specimen:  Blood from Arm, Left Updated:  01/11/19 1414     WBC 5 87 Thousand/uL      RBC 4 18 Million/uL      Hemoglobin 12 9 g/dL      Hematocrit 41 0 %      MCV 98 fL      MCH 30 9 pg      MCHC 31 5 g/dL      RDW 13 2 %      MPV 11 6 fL      Platelets 552 Thousands/uL      nRBC 0 /100 WBCs      Neutrophils Relative 59 %      Immat GRANS % 0 %      Lymphocytes Relative 28 %      Monocytes Relative 10 %      Eosinophils Relative 3 %      Basophils Relative 0 %      Neutrophils Absolute 3 44 Thousands/µL      Immature Grans Absolute 0 01 Thousand/uL      Lymphocytes Absolute 1 62 Thousands/µL      Monocytes Absolute 0 61 Thousand/µL      Eosinophils Absolute 0 17 Thousand/µL      Basophils Absolute 0 02 Thousands/µL     POCT urinalysis dipstick [[id number]]  (Normal) Resulted:  01/11/19 1401    Lab Status:  Final result Specimen:  Urine Updated:  01/11/19 1401    Urine Microscopic [[id number]]  (Normal) Collected:  01/11/19 1314    Lab Status:  Final result Specimen:  Urine from Urine, Indwelling Nichole Catheter Updated:  01/11/19 1334     RBC, UA None Seen /hpf      WBC, UA None Seen /hpf      Epithelial Cells None Seen /hpf      Bacteria, UA None Seen /hpf      Hyaline Casts, UA None Seen /lpf     ED Urine Macroscopic [[id number]]  (Abnormal) Collected:  01/11/19 1314    Lab Status:  Final result Specimen:  Urine Updated:  01/11/19 1313     Color, UA Yellow     Clarity, UA Slightly Cloudy     pH, UA 5 5     Leukocytes, UA Trace (A)     Nitrite, UA Negative     Protein, UA Negative mg/dl      Glucose, UA Negative mg/dl      Ketones, UA Trace (A) mg/dl      Urobilinogen, UA 0 2 E U /dl      Bilirubin, UA Interference- unable to analyze (A)     Blood, UA Trace (A)     Specific [place], UA 1 020    Narrative:       CLINITEK RESULT                 VAS lower limb venous duplex study, complete bilateral   Final Result by Delpha Romberg Doctor, MD (01/11 2245)      X-ray chest 2 views   Final Result by Willow Landis DO (01/11 1548)      No acute cardiopulmonary disease  Workstation performed: [id number]         CT head wo contrast   Final Result by Niall Abarca MD (01/11 0981)      No acute intracranial hemorrhage seen   No mass effect or midline shift   Mild periventricular and white matter hypodensity seen related to chronic small vessel ischemic changes                  Workstation performed: [id number]               Procedures  ECG 12 Lead Documentation  Date/Time: 1/11/2019 1:06 PM  Performed by: Naveen Mi by: Cali Saldivar     Indications / Diagnosis:  Ams  Patient location:  ED  Previous ECG:     Previous ECG:  Compared to current    Comparison ECG info:  1/8/19  Rate:     ECG rate:  58    ECG rate assessment: bradycardic    Rhythm:     Rhythm: sinus rhythm    Ectopy:     Ectopy: none    QRS:     QRS axis:  Left    QRS intervals:  Normal  ST segments:     ST segments: no acute changes   T waves:     T waves comment:  No acute changes             Phone Consults  ED Phone Contact    ED Course  ED Course as of Jan 12 1240 Fri Jan 11, 2019   1627 Will place isaac hugger and admit  Temperature: (!) 94 4 °F (34 7 °C)           Identification of Seniors at Risk      Most Recent Value   (ISAR) Identification of Seniors at Risk   Before the illness or injury that brought you to the Emergency, did you need someone to help you on a regular basis? 0 Filed at: 01/11/2019 1322   In the last 24 hours, have you needed more help than usual?  0 Filed at: 01/11/2019 1322   Have you been hospitalized for one or more nights during the past 6 months? 1 Filed at: 01/11/2019 1322   In general, do you see well?  0 Filed at: 01/11/2019 1322   In general, do you have serious problems with your memory?    Do you take more than three different medications every day?      ISAR Score  1 Filed at: 01/11/2019 1322                          MDM  Number of Diagnoses or Management Options  RANDAL (acute kidney injury) [facility]): new and requires workup  Encephalopathy: new and requires workup  Hypothermia: new and requires workup  Diagnosis management comments: Patient with report of AMS at the NH- will get labs, UA, ct head, tsh, and bilateral LE dopplers to r/o DVT  Will re-evaluate and place warm blankets and give warm fluid for hypothermia  Patient reevaluated and feels improved  Patient updated on results of tests and plan of care including admission to hospital for further evaluation of presenting complaint  Patient demonstrates verbal understanding and agrees with plan  Report to Dr Hank Mcneill  with MUR[facility] for continuation of patient care  Amount and/or Complexity of Data Reviewed  Clinical lab tests: ordered and reviewed  Tests in the radiology section of CPT®: ordered and reviewed  Tests in the medicine section of CPT®: ordered and reviewed  Discussion of test results with the performing providers: yes  Decide to obtain previous medical records or to obtain history from someone other than the patient: yes  Obtain history from someone other than the patient: yes  Review and summarize past medical records: yes  Discuss the patient with other providers: yes  Independent visualization of images, tracings, or specimens: yes    Patient Progress  Patient progress: improved    CritCare Time    Disposition  Final diagnoses:   Encephalopathy   Hypothermia   RANDAL (acute kidney injury) ([facility] 75 )     Time reflects when diagnosis was documented in both MDM as applicable and the Disposition within this note     Time User Action Codes Description Comment    1/11/2019  5:35 PM Cait Kirk Add [G93 40] Encephalopathy     1/11/2019  5:36 PM Keren Pearl 55  XXXA] Hypothermia     1/11/2019  5:36 PM Carlos Lott Add [N17 9] RANDAL (acute kidney injury) ([facility] 75 )     1/12/2019  8:26 AM Bessy Selby Add [F41 8] Depression with anxiety     1/12/2019 12:25 PM Bessy Selby Add [R53 81] Physical deconditioning       ED Disposition     None      Follow-up Information    None         Current Discharge Medication List      CONTINUE these medications which have NOT CHANGED    Details   aspirin (ASPIR-81) 81 mg EC tablet Take 1 tablet by mouth daily      carbamide peroxide (DEBROX) 6 5 % otic solution Administer 5 drops into both ears 2 (two) times a day for 4 days  Qty: 15 mL, Refills: 0    Associated Diagnoses: Cerumen debris on tympanic membrane of both ears      levothyroxine 75 mcg tablet TAKE 1 TABLET DAILY  Qty: 90 tablet, Refills: 0    Associated Diagnoses: Acquired hypothyroidism      lisinopril (ZESTRIL) 10 mg tablet TAKE 1 TABLET DAILY AS DIRECTED  Qty: 90 tablet, Refills: 0    Associated Diagnoses: Essential hypertension      mirtazapine (REMERON) 30 mg tablet TAKE 1 TABLET DAILY AT BEDTIME  Qty: 90 tablet, Refills: 0    Associated Diagnoses: Depression, unspecified depression type      multivitamin (THERAGRAN) TABS Take 1 tablet by mouth daily           No discharge procedures on file  ED Provider  Attending physically available and evaluated Anaya Gum  I managed the patient along with the ED Attending      Electronically Signed by         María Rocha DO  01/12/19 2049

## 2019-01-11 NOTE — ASSESSMENT & PLAN NOTE
· Seen by geriatrics last admission who recommended adding Zoloft 25 mg daily to patient's Remeron but patient declined

## 2019-01-11 NOTE — ED NOTES
Family at the bedside provided some history on the pt  Pt awake and responding appropriately at this time        Mable Guidry RN  01/11/19 1242

## 2019-01-12 PROBLEM — R62.7 FAILURE TO THRIVE IN ADULT: Status: ACTIVE | Noted: 2019-01-12

## 2019-01-12 PROCEDURE — 99225 PR SBSQ OBSERVATION CARE/DAY 25 MINUTES: CPT | Performed by: PHYSICIAN ASSISTANT

## 2019-01-12 NOTE — ASSESSMENT & PLAN NOTE
· Patient now refusing all medications, blood draws, eating  Stating she is ready to die  · Patient would be hospice appropriate if family on board  Will discuss with family  Left message for daughter

## 2019-01-12 NOTE — ASSESSMENT & PLAN NOTE
· Possibly due to hypothermia   Also consider cardiac arrhythmia or neurologic cause  · No arrhythmia on telemetry

## 2019-01-12 NOTE — PROGRESS NOTES
Patient is now more alert and awake she is asking for her daughter  Told patient will pass to day shift to call her at an appropriate time   Since it is 2:00am  Offered water and toileting patient continues to refuse care, and " stated that "I should just let her go"

## 2019-01-12 NOTE — PROGRESS NOTES
Progress Note - Yina Blount 10/30/1923, 80 y o  female MRN: [id number]    Unit/Bed#: [facility] 704-01 Encounter: [id number]    Primary Care Provider: Bebeto Jung MD   Date and time admitted to hospital: 1/11/2019 12:54 PM    * Hypothermia   Assessment & Plan    · Possibly due to advanced age, frailty, and poor nutritional status  · Rule out occult infection  · Procalcitonin normal  Patient refused further lab draws  · TSH within normal limits     Failure to thrive in adult   Assessment & Plan    · Patient now refusing all medications, blood draws, eating  Stating she is ready to die  · Patient would be hospice appropriate if family on board  Will discuss with family  Left message for daughter  Unresponsive episode   Assessment & Plan    · Possibly due to hypothermia  Also consider cardiac arrhythmia or neurologic cause  · No arrhythmia on telemetry     Stage 3 chronic kidney disease (HCC)   Assessment & Plan    · Creatinine overall at baseline  · S/p 1L fluids in ED     NSTEMI (non-ST elevated myocardial infarction) [facility])   Assessment & Plan    · Troponin trending down from last admission  Echo done a few days ago was unremarkable  Suspected due to accelerated HTN in the setting of renal insufficiency  Trend troponin  Overall trending down from last admission     Depression with anxiety   Assessment & Plan    · Seen by geriatrics last admission who recommended adding Zoloft 25 mg daily to patient's Remeron but patient declined     Essential hypertension   Assessment & Plan    · On lisinopril       VTE Pharmacologic Prophylaxis:   Pharmacologic: Heparin  Mechanical VTE Prophylaxis in Place: Yes    Patient Centered Rounds: I have performed bedside rounds with nursing staff today  Discussions with Specialists or Other Care Team Provider:      Education and Discussions with Family / Patient: Patient  Left message for daughter to call back  Time Spent for Care: 30 minutes    More than 50% of total time spent on counseling and coordination of care as described above  Current Length of Stay: 0 day(s)    Current Patient Status: Observation   Certification Statement: The patient, admitted on an observation basis, will now require > 2 midnight hospital stay due to failure to thrive    Discharge Plan: None yet  Patient not appropriate for discharge back to assisted living facility at this time  Code Status: Level 3 - DNAR and DNI      Subjective:   Overnight patient reported to several staff members that she wants nothing done and is ready to die  She is refusing her medications, blood draws, and eating or drinking  When I tried to talk to the patient she stated she didn't want to talk  Objective:     Vitals:   Temp (24hrs), Av 9 °F (36 1 °C), Min:94 4 °F (34 7 °C), Max:98 5 °F (36 9 °C)    Temp:  [94 4 °F (34 7 °C)-98 5 °F (36 9 °C)] 97 6 °F (36 4 °C)  HR:  [58-86] 81  Resp:  [18-22] 18  BP: (106-183)/(55-93) 130/70  SpO2:  [95 %-99 %] 96 %  Body mass index is 23 9 kg/m²  Input and Output Summary (last 24 hours): Intake/Output Summary (Last 24 hours) at 19 1243  Last data filed at 19 1149   Gross per 24 hour   Intake               80 ml   Output             1000 ml   Net             -920 ml       Physical Exam:     Physical Exam   Constitutional:   Frail   HENT:   Head: Normocephalic and atraumatic  Eyes: No scleral icterus  Neck: Neck supple  Cardiovascular: Normal rate, regular rhythm and normal heart sounds  Pulmonary/Chest: Effort normal and breath sounds normal  No respiratory distress  She has no wheezes  She has no rales  Abdominal: Soft  Bowel sounds are normal  She exhibits no distension  There is no tenderness  Musculoskeletal: She exhibits edema  Neurological: She is alert  Eyes closed but talks  Will not answer orientation questions   Skin: Skin is warm and dry  Psychiatric:   Withdrawn         Additional Data:     Labs:      Results from last 7 days  Lab Units 01/11/19 2023 01/11/19  1357   WBC Thousand/uL  --  5 87   HEMOGLOBIN g/dL  --  12 9   HEMATOCRIT %  --  41 0   PLATELETS Thousands/uL 144* 157   NEUTROS PCT %  --  59   LYMPHS PCT %  --  28   MONOS PCT %  --  10   EOS PCT %  --  3       Results from last 7 days  Lab Units 01/11/19  1357   POTASSIUM mmol/L 4 6   CHLORIDE mmol/L 109*   CO2 mmol/L 24   BUN mg/dL 33*   CREATININE mg/dL 1 38*   CALCIUM mg/dL 10 8*   ALK PHOS U/L 66   ALT U/L 17   AST U/L 27           * I Have Reviewed All Lab Data Listed Above  * Additional Pertinent Lab Tests Reviewed: All Labs Within Last 24 Hours Reviewed    Imaging:    Imaging Reports Reviewed Today Include: None  Imaging Personally Reviewed by Myself Includes:  None    Recent Cultures (last 7 days):       Results from last 7 days  Lab Units 01/08/19  1833   INFLUENZA B PCR  None Detected   RSV PCR  None Detected       Last 24 Hours Medication List:     Current Facility-Administered Medications:  acetaminophen 650 mg Oral Q6H PRN Magali Alvarez PA-C   aspirin 81 mg Oral Daily Magali Alvarez PA-C   calcium carbonate 1,000 mg Oral Daily PRN Magali Alvarez PA-C   docusate sodium 100 mg Oral BID Magali Alvarez PA-C   heparin (porcine) 5,000 Units Subcutaneous [facility] Magali Alvarez PA-C   levothyroxine 75 mcg Oral Early Morning Magali Alvarez PA-C   lisinopril 10 mg Oral Daily Magali Alvarez PA-C   mirtazapine 30 mg Oral HS Magali Alvarez PA-C   ondansetron 4 mg Intravenous Q6H PRN Magali Alvarez PA-C        Today, Patient Was Seen By: Magali Alvarez PA-C    ** Please Note: Dragon 360 Dictation voice to text software may have been used in the creation of this document   **

## 2019-01-12 NOTE — PLAN OF CARE
COPING     Pt/Family able to verbalize concerns and demonstrate effective coping strategies Progressing     Will report anxiety at manageable levels Progressing        DISCHARGE PLANNING     Discharge to home or other facility with appropriate resources Progressing        INFECTION - ADULT     Absence or prevention of progression during hospitalization Progressing        Knowledge Deficit     Patient/family/caregiver demonstrates understanding of disease process, treatment plan, medications, and discharge instructions Progressing        PAIN - ADULT     Verbalizes/displays adequate comfort level or baseline comfort level Progressing        Potential for Falls     Patient will remain free of falls Progressing        Prexisting or High Potential for Compromised Skin Integrity     Skin integrity is maintained or improved Progressing        SAFETY ADULT     Patient will remain free of falls Progressing     Maintain or return to baseline ADL function Progressing     Maintain or return mobility status to optimal level Progressing

## 2019-01-12 NOTE — UTILIZATION REVIEW
[address]  Utilization Review Department  Phone: [phone]; Fax [phone]  [email or web address]  ATTENTION: Please call with any questions or concerns to [phone]  and carefully listen to the prompts so that you are directed to the right person  Send all requests for admission clinical reviews, approved or denied determinations and any other requests to fax [phone]  All voicemails are confidential     Initial Clinical Review    Admission: Date/Time/Statement: 01/11/19 @ 1627 -- OBS    Orders Placed This Encounter   Procedures    Place in Observation     Standing Status:   Standing     Number of Occurrences:   1     Order Specific Question:   Admitting Physician     Answer:   Bill Aguillon [[id number]]     Order Specific Question:   Level of Care     Answer:   Med Surg [16]     ED: Date/Time/Mode of Arrival:   ED Arrival Information     Expected Arrival Acuity Means of Arrival Escorted By Service Admission Type    - 1/11/2019 12:53 Urgent Ambulance [facility] Hospitalist Urgent    Arrival Complaint    altered mental status        Chief Complaint:   Chief Complaint   Patient presents with    Altered Mental Status     Pt was recently d/c from the hospital  Pt was seen at breakfast and when looked in on, would not wake up or respond  History of Illness:  80 y  o  female with a history of HTN, hypothyroidism, depression, and CKD who presents with unresponsive episode at assisted living facility  Patient was actually just discharged from the hospital yesterday  She was admitted from 1/8/19-1/11/19 for generalized weakness and found to have a mildly elevated troponin in the setting of renal failure and accelerated hypertension  Echo was unremarkable and patient was discharged back to facility  She was also noted to be more depressed than normal given late 's birthday was a few days prior   She was seen by geriatrics who recommended addition of Zoloft but patient refused this  Her family states she was doing remarkably well last evening and was even ambulating without a walker  This morning, she wasn't feeling good and they facility brought her breakfast to her room  They went to check on her for lunch and she was noted to be unresponsive and would not wake up to sternal rub  It is unclear how long she was unresponsive for  In the ED she was found to be hypothermic and placed on [employer]  She is now awake and alert  She denies complaints other than pain from left knee to left ankle which has been present since last admission  She is tearful and states that she is ready to be with her  (who is )         ED Vital Signs:   ED Triage Vitals [19 1320]   Temperature Pulse Respirations Blood Pressure SpO2   (!) 95 5 °F (35 3 °C) 73 22 (!) 183/93 95 %      Temp Source Heart Rate Source Patient Position - Orthostatic VS BP Location FiO2 (%)   Rectal Monitor Lying Right arm --      Pain Score       No Pain        Wt Readings from Last 1 Encounters:   19 61 2 kg (134 lb 14 7 oz)     Vital Signs (abnormal):   19 0804  97 6 °F (36 4 °C)  85  18  125/65  96 %    19 2204  98 5 °F (36 9 °C)  86  18  123/61  96 %    19  --  85  18  119/55  96 %    19  --  --  --  --  --    19 194  97 6 °F (36 4 °C)  --  --  --  --    19 1917   96 8 °F (36 °C)  --  --  --  --    19 1802  --  66  18  106/62  96 %    19 1611  --  61  19  165/76  99 %    19 1610   94 4 °F (34 7 °C)  --  --  --  --    19 1402  --  58  22  137/62  95 %    19 1320   95 5 °F (35 3 °C)  73  22   183/93  95 %        Pertinent Labs/Diagnostic Test Results: , BUN 33, CR 1 38, CA 10 8, ALBUMIN 3 4  TROP 0 10 -- 0 11  UA -- (+) TR KETONES, TR BLOOD, TR LEUKS    CXR-- NO ACUTE ABNL  CT HEAD --   No acute intracranial hemorrhage seen   No mass effect or midline shift   Mild periventricular and white matter hypodensity seen related to chronic small vessel ischemic changes     EKG: Read as a fib with slow ventricular response but looks like sinus ashvin vs possible a flutter? ED Treatment:   Medication Administration from 01/11/2019 1253 to 01/11/2019 1924       Date/Time Order Dose Route Action Action by Comments     01/11/2019 1400 sodium chloride 0 9 % bolus 1,000 mL 1,000 mL Intravenous New Bag Ana Medel RN         Past Medical/Surgical History: Active Ambulatory Problems     Diagnosis Date Noted    Essential hypertension 04/12/2018    Hypothyroidism 04/12/2018    Depression with anxiety 04/12/2018    Vitamin D deficiency 08/08/2018    NSTEMI (non-ST elevated myocardial infarction) ([facility] ) 01/08/2019    Generalized weakness 01/08/2019    Stage 3 chronic kidney disease ([facility] ) 01/08/2019    Depression 01/09/2019    Forgetfulness 01/09/2019    Ambulatory dysfunction 01/09/2019    Physical deconditioning 01/09/2019     Resolved Ambulatory Problems     Diagnosis Date Noted    No Resolved Ambulatory Problems     Past Medical History:   Diagnosis Date    Depression with anxiety     Hypertension     Hypothyroidism      Admitting Diagnosis: Hypothermia [T68  XXXA]  Altered mental status [R41 82]  Encephalopathy [G93 40]  RANDAL (acute kidney injury) ([facility] ) [N17 9]  Age/Sex: 80 y o  female     Assessment/Plan:   * Hypothermia   Assessment & Plan     · Possibly due to advanced age, frailty, and poor nutritional status  · Rule out occult infection  Check blood cultures and procalcitonin  · Check cortisol  · TSH within normal limits  · Jelena Hugger      Unresponsive episode   Assessment & Plan     · Possibly due to hypothermia   Also consider cardiac arrhythmia or neurologic cause  · Maintain telemetry to rule out arrhythmia      Stage 3 chronic kidney disease (HCC)   Assessment & Plan     · Creatinine overall at baseline  · S/p 1L fluids in ED      NSTEMI (non-ST elevated myocardial infarction) ([facility] ) Assessment & Plan     · Troponin trending down from last admission  Echo done a few days ago was unremarkable  Suspected due to accelerated HTN in the setting of renal insufficiency  Trend troponin   Overall trending down from last admission      Depression with anxiety   Assessment & Plan     · Seen by geriatrics last admission who recommended adding Zoloft 25 mg daily to patient's Remeron but patient declined      Essential hypertension   Assessment & Plan     · On lisinopril     Anticipated Length of Stay: Brett Blair will be admitted on an Observation basis with an anticipated length of stay of  < 2 midnights    Justification for Hospital Stay: Hypothermia, altered mental status    Admission Orders:  Scheduled Meds:   Current Facility-Administered Medications:  acetaminophen 650 mg Oral Q6H PRN CHINA Gracia-RIVER   aspirin 81 mg Oral Daily CHINA Gracia-RIVER   calcium carbonate 1,000 mg Oral Daily PRN CHINA Gracia-RIVER   docusate sodium 100 mg Oral BID CHINA Gracia-RIVER   heparin (porcine) 5,000 Units Subcutaneous [facility] Valeriano Lund PA-C   levothyroxine 75 mcg Oral Early Morning CHINA Gracia-RIVER   lisinopril 10 mg Oral Daily CHINA Gracia-RIVER   mirtazapine 30 mg Oral HS CHINA Gracia-RIVER   ondansetron 4 mg Intravenous Q6H PRN CHINA Gracia-RIVER     TELEM  SAFIA CHRISTENSEN  CONSULT PSYCH  REG DIET  TROP Q3H  SCD'S  UP WITH ASSIST

## 2019-01-12 NOTE — ASSESSMENT & PLAN NOTE
· Possibly due to advanced age, frailty, and poor nutritional status  · Rule out occult infection    · Procalcitonin normal  Patient refused further lab draws  · TSH within normal limits

## 2019-01-12 NOTE — PROGRESS NOTES
Admit the patient, since admission she has been stating that she wants to die  " this is to much I just want to be with my "  She also is refusing oral medication and stating that she wants nothing else done  She did tell the doctor who rounded on her that she wants to die  They are aware  Will continue to monitor temp  & vitals  Jelena olvera is on to raise body temp

## 2019-01-12 NOTE — PROGRESS NOTES
Met with patient's daughter, Olga Hay, at bedside  Discussed patient's decline and no known reversible cause  Discussed that patient is refusing to eat, take any medications, or allowing any blood to be drawn  Olga Hay requesting patient go home with hospice  Hospice liaison consulted  Comfort care only

## 2019-01-13 PROCEDURE — 99224 PR SBSQ OBSERVATION CARE/DAY 15 MINUTES: CPT | Performed by: PHYSICIAN ASSISTANT

## 2019-01-13 NOTE — SOCIAL WORK
CM met with dtr who states that she can be available tomorrow afternoon via phone at [phone] to discuss hospice options for pt

## 2019-01-13 NOTE — PROGRESS NOTES
Progress Note - Jeanne Profit 10/30/1923, 80 y o  female MRN: [id number]    Unit/Bed#: [facility] 704-01 Encounter: [id number]    Primary Care Provider: Liam Cooper MD   Date and time admitted to hospital: 1/11/2019 12:54 PM    * Hypothermia   Assessment & Plan    · Possibly due to advanced age, frailty, and poor nutritional status  · Rule out occult infection  · Procalcitonin normal  Patient refused further lab draws  · TSH within normal limits     Failure to thrive in adult   Assessment & Plan    · Patient's daughter decided on home hospice  · Hospice liaison to evaluation patient     Unresponsive episode   Assessment & Plan    · Possibly due to hypothermia  Also consider cardiac arrhythmia or neurologic cause  · No arrhythmia on telemetry     NSTEMI (non-ST elevated myocardial infarction) [facility])   Assessment & Plan    · Troponin trending down from last admission  Echo done a few days ago was unremarkable  Suspected due to accelerated HTN in the setting of renal insufficiency  Trend troponin  Overall trending down from last admission     Depression with anxiety   Assessment & Plan    · Seen by geriatrics last admission who recommended adding Zoloft 25 mg daily to patient's Remeron but patient declined       VTE Pharmacologic Prophylaxis:   Pharmacologic: None- comfort care  Mechanical VTE Prophylaxis in Place: No    Patient Centered Rounds: I have performed bedside rounds with nursing staff today  Discussions with Specialists or Other Care Team Provider:      Education and Discussions with Family / Patient: Patient  Will speak with daughter when she comes in later today    Time Spent for Care: 20 minutes  More than 50% of total time spent on counseling and coordination of care as described above      Current Length of Stay: 0 day(s)    Current Patient Status: Observation   Certification Statement: The patient, admitted on an observation basis, will now require > 2 midnight hospital stay due to discharge planning    Discharge Plan: Discharge home with hospice when arranged    Code Status: Level 4 - Comfort Care      Subjective:   Patient more alert today  Reports not feeling well but doesn't know why  Appetite is poor  Not in pain  Objective:     Vitals:   Temp (24hrs), Av 1 °F (36 7 °C), Min:97 6 °F (36 4 °C), Max:98 6 °F (37 °C)    Temp:  [97 6 °F (36 4 °C)-98 6 °F (37 °C)] 98 6 °F (37 °C)  HR:  [79-81] 79  Resp:  [18] 18  BP: (130-178)/(70-76) 178/76  SpO2:  [96 %-97 %] 97 %  Body mass index is 23 9 kg/m²  Input and Output Summary (last 24 hours): Intake/Output Summary (Last 24 hours) at 19 1111  Last data filed at 19 0746   Gross per 24 hour   Intake              360 ml   Output              625 ml   Net             -265 ml       Physical Exam:     Physical Exam   Constitutional:   Frail   HENT:   Head: Normocephalic and atraumatic  Eyes: No scleral icterus  Neck: Neck supple  Cardiovascular: Normal rate, regular rhythm and normal heart sounds  Pulmonary/Chest: Effort normal and breath sounds normal  No respiratory distress  She has no wheezes  She has no rales  Abdominal: Soft  Bowel sounds are normal  She exhibits no distension  Musculoskeletal: She exhibits no edema  Neurological: She is alert  Skin: Skin is warm  Psychiatric:   Flat       Additional Data:     Labs:      Results from last 7 days  Lab Units 19  1357   WBC Thousand/uL  --  5 87   HEMOGLOBIN g/dL  --  12 9   HEMATOCRIT %  --  41 0   PLATELETS Thousands/uL 144* 157   NEUTROS PCT %  --  59   LYMPHS PCT %  --  28   MONOS PCT %  --  10   EOS PCT %  --  3       Results from last 7 days  Lab Units 19  1357   POTASSIUM mmol/L 4 6   CHLORIDE mmol/L 109*   CO2 mmol/L 24   BUN mg/dL 33*   CREATININE mg/dL 1 38*   CALCIUM mg/dL 10 8*   ALK PHOS U/L 66   ALT U/L 17   AST U/L 27           * I Have Reviewed All Lab Data Listed Above  * Additional Pertinent Lab Tests Reviewed:  All Labs Within Last 24 Hours Reviewed    Imaging:    Imaging Reports Reviewed Today Include:    Imaging Personally Reviewed by Myself Includes:       Recent Cultures (last 7 days):       Results from last 7 days  Lab Units 01/08/19  1833   INFLUENZA B PCR  None Detected   RSV PCR  None Detected       Last 24 Hours Medication List:     Current Facility-Administered Medications:  acetaminophen 650 mg Oral Q6H PRN Hyacinth Aguillon PA-C   calcium carbonate 1,000 mg Oral Daily PRN Hyacinth Aguillon PA-C   docusate sodium 100 mg Oral BID Hyacinth Aguillon PA-C   mirtazapine 30 mg Oral HS Hyacinth Aguillon PA-C   ondansetron 4 mg Intravenous Q6H PRN Hyacinth Aguillon PA-C        Today, Patient Was Seen By: Hyacinth Aguillon PA-C    ** Please Note: Dragon 360 Dictation voice to text software may have been used in the creation of this document   **

## 2019-01-13 NOTE — SOCIAL WORK
CM received consult for home hospice  CM spoke to carlitos Pickens on phone and family prefers referral to [facility]  Dtr states that she will be in pt's room this afternoon if liaison is able to met with her  CM spoke to liaison on the phone and she is unable to meet with family today but that a liaison will follow up with family tomorrow

## 2019-01-14 ENCOUNTER — TRANSITIONAL CARE MANAGEMENT (OUTPATIENT)
Dept: INTERNAL MEDICINE CLINIC | Facility: CLINIC | Age: 84
End: 2019-01-14

## 2019-01-14 VITALS
TEMPERATURE: 98.8 F | RESPIRATION RATE: 18 BRPM | HEART RATE: 69 BPM | HEIGHT: 63 IN | WEIGHT: 134.92 LBS | DIASTOLIC BLOOD PRESSURE: 66 MMHG | SYSTOLIC BLOOD PRESSURE: 139 MMHG | OXYGEN SATURATION: 94 % | BODY MASS INDEX: 23.91 KG/M2

## 2019-01-14 PROCEDURE — 99225 PR SBSQ OBSERVATION CARE/DAY 25 MINUTES: CPT | Performed by: PHYSICIAN ASSISTANT

## 2019-01-14 RX ADMIN — MIRTAZAPINE 30 MG: 30 TABLET, FILM COATED ORAL at 21:34

## 2019-01-14 NOTE — PROGRESS NOTES
Progress Note - Oz Comment 10/30/1923, 80 y o  female MRN: [id number]    [facility] Encounter: [id number]    Primary Care Provider: Hermelinda Peters MD   Date and time admitted to hospital: 1/11/2019 12:54 PM    * Hypothermia   Assessment & Plan    · Possibly due to advanced age, frailty, and poor nutritional status  · Rule out occult infection  · Procalcitonin normal  Patient refused further lab draws  · TSH within normal limits     Failure to thrive in adult   Assessment & Plan    · Refusing meds, food, blood draws  Intermittent confusion  · Patient's daughter decided on home hospice  · Hospice liaison to evaluation patient     Unresponsive episode   Assessment & Plan    · Possibly due to hypothermia  Also consider cardiac arrhythmia or neurologic cause  · No arrhythmia on telemetry     NSTEMI (non-ST elevated myocardial infarction) [facility])   Assessment & Plan    · Troponin trending down from last admission  Echo done a few days ago was unremarkable  Suspected due to accelerated HTN in the setting of renal insufficiency  Trend troponin  Overall trending down from last admission     Depression with anxiety   Assessment & Plan    · Seen by geriatrics last admission who recommended adding Zoloft 25 mg daily to patient's Remeron but patient declined       VTE Pharmacologic Prophylaxis:   Pharmacologic: None-comfort care  Mechanical VTE Prophylaxis in Place: Yes    Patient Centered Rounds: I have performed bedside rounds with nursing staff today  Discussions with Specialists or Other Care Team Provider:     Education and Discussions with Family / Patient: Patient  Provided update to daughter by phone  Plan is still for hospice  Time Spent for Care: 20 minutes  More than 50% of total time spent on counseling and coordination of care as described above      Current Length of Stay: 0 day(s)    Current Patient Status: Observation   Certification Statement: Continue observation stay for hospice arrangements    Discharge Plan: Stable for home hospice when this can be arranged    Code Status: Level 4 - Comfort Care      Subjective:   More awake today  Denies any complaints  Intermittently confused  No pain  Objective:     Vitals:   Temp (24hrs), Av 5 °F (36 9 °C), Min:98 1 °F (36 7 °C), Max:98 8 °F (37 1 °C)    Temp:  [98 1 °F (36 7 °C)-98 8 °F (37 1 °C)] 98 8 °F (37 1 °C)  HR:  [69-78] 69  Resp:  [18] 18  BP: (112-139)/(58-66) 139/66  SpO2:  [94 %-96 %] 94 %  Body mass index is 23 9 kg/m²  Input and Output Summary (last 24 hours): Intake/Output Summary (Last 24 hours) at 19 1032  Last data filed at 19 1021   Gross per 24 hour   Intake              958 ml   Output             1031 ml   Net              -73 ml       Physical Exam:     Physical Exam   Constitutional:   Frail   Eyes: No scleral icterus  Neck: Neck supple  Cardiovascular: Normal rate, regular rhythm and normal heart sounds  Pulmonary/Chest: Effort normal and breath sounds normal  No respiratory distress  She has no wheezes  She has no rales  Abdominal: Soft  Bowel sounds are normal    Musculoskeletal: She exhibits edema  Neurological: She is alert  Skin: Skin is warm and dry  Psychiatric: She has a normal mood and affect  Additional Data:     Labs:      Results from last 7 days  Lab Units 19  1357   WBC Thousand/uL  --  5 87   HEMOGLOBIN g/dL  --  12 9   HEMATOCRIT %  --  41 0   PLATELETS Thousands/uL 144* 157   NEUTROS PCT %  --  59   LYMPHS PCT %  --  28   MONOS PCT %  --  10   EOS PCT %  --  3       Results from last 7 days  Lab Units 19  1357   POTASSIUM mmol/L 4 6   CHLORIDE mmol/L 109*   CO2 mmol/L 24   BUN mg/dL 33*   CREATININE mg/dL 1 38*   CALCIUM mg/dL 10 8*   ALK PHOS U/L 66   ALT U/L 17   AST U/L 27           * I Have Reviewed All Lab Data Listed Above  * Additional Pertinent Lab Tests Reviewed:  All Labs Within Last 24 Hours Reviewed    Imaging:    Imaging Reports Reviewed Today Include: None  Imaging Personally Reviewed by Myself Includes:  None    Recent Cultures (last 7 days):       Results from last 7 days  Lab Units 01/08/19  1833   INFLUENZA B PCR  None Detected   RSV PCR  None Detected       Last 24 Hours Medication List:     Current Facility-Administered Medications:  acetaminophen 650 mg Oral Q6H PRN Minetta Rick, PA-RIVER   calcium carbonate 1,000 mg Oral Daily PRN Minetta Rick, PA-C   docusate sodium 100 mg Oral BID Minetta CHINA Cabrera-RIVER   mirtazapine 30 mg Oral HS Leo Cabrera PA-C   ondansetron 4 mg Intravenous Q6H PRN Minealana SILVIA Cabrera        Today, Patient Was Seen By: Leo Cabrera PA-C    ** Please Note: Dragon 360 Dictation voice to text software may have been used in the creation of this document   **

## 2019-01-14 NOTE — RESTORATIVE TECHNICIAN NOTE
Restorative Specialist Mobility Note       Activity: Other (Comment) (Educated/encouraged pt to ambulate with assistance 3-4 x's/day, pt refused 2* not feeling up to it  Bed alarm on   Pt callbell, phone/tray within reach )       [employer] BS, Restorative Technician, [employer]

## 2019-01-14 NOTE — SOCIAL WORK
BLS transport scheduled with Hali Escalante @ [facility] for 2:30pm pickup  Pt's daughter, Ryan Clark, nursing and Amor Trinidad at [facility] updated re: time  CMN form completed and faxed to [employer] [facility]

## 2019-01-14 NOTE — PLAN OF CARE
Problem: DISCHARGE PLANNING - CARE MANAGEMENT  Goal: Discharge to post-acute care or home with appropriate resources  INTERVENTIONS:  - Conduct assessment to determine patient/family and health care team treatment goals, and need for post-acute services based on payer coverage, community resources, and patient preferences, and barriers to discharge  - Address psychosocial, clinical, and financial barriers to discharge as identified in assessment in conjunction with the patient/family and health care team  - Arrange appropriate level of post-acute services according to patient's   needs and preference and payer coverage in collaboration with the physician and health care team  - Communicate with and update the patient/family, physician, and health care team regarding progress on the discharge plan  - Arrange appropriate transportation to post-acute venues  - Home w/hospice   Outcome: Progressing

## 2019-01-14 NOTE — ASSESSMENT & PLAN NOTE
· Refusing meds, food, blood draws   Intermittent confusion  · Patient's daughter decided on home hospice  · Hospice liaison to evaluation patient

## 2019-01-14 NOTE — UTILIZATION REVIEW
Continued Stay Review    Date: 1-14-19        Vital Signs: /66 (BP Location: Left arm)   Pulse 69   Temp 98 8 °F (37 1 °C) (Oral)   Resp 18   Ht 5' 3" (1 6 m)   Wt 61 2 kg (134 lb 14 7 oz)   SpO2 94%   BMI 23 90 kg/m²   Assessment/Plan:     Hypothermia   Assessment & Plan     · Possibly due to advanced age, frailty, and poor nutritional status  · Rule out occult infection  · Procalcitonin normal  Patient refused further lab draws  · TSH within normal limits      Failure to thrive in adult   Assessment & Plan     · Patient now refusing all medications, blood draws, eating  Stating she is ready to die  · Patient would be hospice appropriate if family on board  Will discuss with family  Left message for daughter       Unresponsive episode   Assessment & Plan     · Possibly due to hypothermia  Also consider cardiac arrhythmia or neurologic cause  · No arrhythmia on telemetry      Stage 3 chronic kidney disease (HCC)   Assessment & Plan     · Creatinine overall at baseline  · S/p 1L fluids in ED      NSTEMI (non-ST elevated myocardial infarction) [facility])   Assessment & Plan     · Troponin trending down from last admission  Echo done a few days ago was unremarkable  Suspected due to accelerated HTN in the setting of renal insufficiency  Trend troponin  Overall trending down from last admission      Depression with anxiety   Assessment & Plan     · Seen by geriatrics last admission who recommended adding Zoloft 25 mg daily to patient's Remeron but patient declined         Comfort care     Medications:       acetaminophen 650 mg Oral Q6H PRN   calcium carbonate 1,000 mg Oral Daily PRN   docusate sodium 100 mg Oral BID   mirtazapine 30 mg Oral HS   ondansetron 4 mg Intravenous Q6H PRN     Pertinent Labs/Diagnostic Results:         Age/Sex: 80 y o  female     Refusing to eat, take any medications, or allowing any blood to be drawn  Comfort care    Hospice consult     Discharge Plan: hospice  Options to be discussed with daughter today

## 2019-01-14 NOTE — HOSPICE NOTE
Called daughter, Lorin Ibarra, she thanked me for the call and reports they are going with [facility] hospice as this is hospice that facility uses  DESTINEY Rojas made aware

## 2019-01-14 NOTE — SOCIAL WORK
Pt is <30 day readmit  CM met w/pt, daughter Denilson Chowdhury [phone] and RANJEET @ bedside to discuss CM role and possible d/c needs  Pt is a resident at [facility] by the [facility]  Receives assistance with ADLs/IADLs  Pt has RW  Denies hx VNA, SNF  Denies hx mental health/D&A inpatient treatment  Hx depression and anxiety  Pt does not have POA  Preferred pharmacy is [facility] or [facility]  [address] (contracted w/[facility]) met w/family who is  agreeable to hospice at [facility]  Liaison ordering DME, meds for delivery on Tuesday after 12:00pm  Confirmed w/Mel @ [address] [place] [address] they are willing to accept pt tomorrow w/hospice  Pt can transport to [facility] after noon Tuesday  SLIM-PA, family aware  CM reviewed d/c planning process including the following: identifying help at home, patient preference for d/c planning needs, Discharge Lounge, [facility] Meds to Bed program, availability of treatment team to discuss questions or concerns patient and/or family may have regarding understanding medications and recognizing signs and symptoms once discharged  CM also encouraged patient to follow up with all recommended appointments after discharge  Patient advised of importance for patient and family to participate in managing patients medical well being

## 2019-01-15 PROBLEM — R41.89 UNRESPONSIVE EPISODE: Status: RESOLVED | Noted: 2019-01-11 | Resolved: 2019-01-15

## 2019-01-15 PROBLEM — T68.XXXA HYPOTHERMIA: Status: RESOLVED | Noted: 2019-01-11 | Resolved: 2019-01-15

## 2019-01-15 PROCEDURE — 99217 PR OBSERVATION CARE DISCHARGE MANAGEMENT: CPT | Performed by: INTERNAL MEDICINE

## 2019-01-15 RX ORDER — ACETAMINOPHEN 325 MG/1
650 TABLET ORAL EVERY 6 HOURS PRN
Qty: 30 TABLET | Refills: 0
Start: 2019-01-15

## 2019-01-15 RX ORDER — DOCUSATE SODIUM 100 MG/1
100 CAPSULE, LIQUID FILLED ORAL 2 TIMES DAILY
Qty: 10 CAPSULE | Refills: 0
Start: 2019-01-15

## 2019-01-15 NOTE — PLAN OF CARE
COPING     Pt/Family able to verbalize concerns and demonstrate effective coping strategies Progressing     Will report anxiety at manageable levels Progressing        DISCHARGE PLANNING     Discharge to home or other facility with appropriate resources Progressing        DISCHARGE PLANNING - CARE MANAGEMENT     Discharge to post-acute care or home with appropriate resources Progressing        INFECTION - ADULT     Absence or prevention of progression during hospitalization Progressing        Knowledge Deficit     Patient/family/caregiver demonstrates understanding of disease process, treatment plan, medications, and discharge instructions Progressing        PAIN - ADULT     Verbalizes/displays adequate comfort level or baseline comfort level Progressing        Potential for Falls     Patient will remain free of falls Progressing        Prexisting or High Potential for Compromised Skin Integrity     Skin integrity is maintained or improved Progressing        SAFETY ADULT     Patient will remain free of falls Progressing     Maintain or return to baseline ADL function Progressing     Maintain or return mobility status to optimal level Progressing

## 2019-01-15 NOTE — RESTORATIVE TECHNICIAN NOTE
Restorative Specialist Mobility Note       Activity: Other (Comment) (Educated/encouraged pt to ambulate with assistance 3-4 x's/day, pt refused nursing aware  Bed alarm on  Pt callbell, phone/tray within reach )              Repositioned:  Other (Comment) (Rep /sat pt upright in bed  )       Winifred QUEZADA, Restorative Technician,

## 2019-01-15 NOTE — DISCHARGE SUMMARY
Discharge Summary - [facility] Internal Medicine    Patient Information: Jeanne Hopson 80 y o  female MRN: [id number]  [facility] Encounter: [id number]    Discharging Physician / Practitioner: Yenifer Villanueva DO  PCP: Liam Cooper MD  Admission Date: 1/11/2019  Discharge Date: 01/15/19    Disposition:     Home with VNA Services (Reminder: Complete face to face encounter)    Reason for Admission:   Failure to thrive  Hypothermia  Unresponsive episode    Discharge Diagnoses:     Principal Problem (Resolved):    Hypothermia  Active Problems:    Essential hypertension    Depression with anxiety    NSTEMI (non-ST elevated myocardial infarction) ([facility] [address] )    Stage 3 chronic kidney disease ([facility] )    Failure to thrive in adult  Resolved Problems:    Unresponsive episode      Consultations During Hospital Stay:  · none    Procedures Performed:   Head CT scan without acute abnormality  Chest x-ray without acute abnormality  Lower extremity venous Doppler no DVT  ·     Significant Findings / Test Results:     · As above    Incidental Findings:   · none    Test Results Pending at Discharge (will require follow up):   · none     Outpatient Tests Requested:  · none    Complications:  none    Hospital Course:     Jeanne Hopson is a 80 y o  female patient who originally presented to the hospital on 1/11/2019 due to unresponsive episode at the assisted living facility, patient was not feeling good  She was evaluated in the emergency room was found to be hypothermic  She was admitted from 1/8/19-1/11/19 for generalized weakness and found to have a mildly elevated troponin in the setting of renal failure and accelerated hypertension  Echo was unremarkable and patient was discharged back to facility    Patient was admitted the hospital, hydrated with IV fluid, she was refusing to eat, take medications or allowing any blood to be drawn  Daughter requesting patient to go home with home hospice, she was made comfort care level 4  Patient will be discharged home with hospice today    Condition at Discharge: poor     Discharge Day Visit / Exam:     Subjective:    Patient seen and examined  Comfortable in bed  Denied pain  Vitals: Blood Pressure: 139/66 (01/14/19 0731)  Pulse: 69 (01/14/19 0731)  Temperature: 98 8 °F (37 1 °C) (01/14/19 0731)  Temp Source: Oral (01/14/19 0731)  Respirations: 18 (01/14/19 0731)  Height: 5' 3" (160 cm) (01/11/19 1320)  Weight - Scale: 61 2 kg (134 lb 14 7 oz) (01/11/19 1320)  SpO2: 94 % (01/14/19 0731)  Exam:   Physical Exam  Patient is awake in no acute distress  For rales  Lung clear to auscultation bilateral  Heart positive S1-S2 no murmur  Abdomen soft nontender positive bowel sounds  Lower extremities trace edema  Discussion with Family:  Patient was scheduled to leave the hospital today at 2:30 pm    Discharge instructions/Information to patient and family:   See after visit summary for information provided to patient and family  Provisions for Follow-Up Care:  See after visit summary for information related to follow-up care and any pertinent home health orders  Planned Readmission: no     Discharge Statement:  I spent 34 minutes discharging the patient  This time was spent on the day of discharge  I had direct contact with the patient on the day of discharge  Greater than 50% of the total time was spent examining patient, answering all patient questions, arranging and discussing plan of care with patient as well as directly providing post-discharge instructions  Additional time then spent on discharge activities  Discharge Medications:  See after visit summary for reconciled discharge medications provided to patient and family        ** Please Note: This note has been constructed using a voice recognition system **

## 2019-01-15 NOTE — PROGRESS NOTES
01/15/19 [address] Involvement Patient not active with Christianity   Spiritual Beliefs/Perceptions   Concept of God Accepting   Relationship with God Close   Support Systems Children   Stress Factors   Patient Stress Factors Health changes; Loss of control   Coping Responses   Patient Coping Anxiety;Open/discussion; Sadness   Patient Spiritual Assessment   Feelings of Discouragement pt talked of being ready to die and wishing she could go   Plan of Care   Comments Consult to visit with pt  Provided chaplaincy education  Cultivated a relationship of care and support  Listened empathically  Explored spirtual needs and resources  Explored relational needs and resources  Explored emotional needs and resources  provided anxiety containment  verbally processed emotions    Unknown outcome   Assessment Completed by: Unit visit

## 2019-01-16 ENCOUNTER — TRANSITIONAL CARE MANAGEMENT (OUTPATIENT)
Dept: INTERNAL MEDICINE CLINIC | Facility: CLINIC | Age: 84
End: 2019-01-16

## 2019-01-28 ENCOUNTER — APPOINTMENT (OUTPATIENT)
Dept: LAB | Facility: IMAGING CENTER | Age: 84
End: 2019-01-28
Payer: MEDICARE

## 2019-01-28 DIAGNOSIS — I10 ESSENTIAL HYPERTENSION: ICD-10-CM

## 2019-01-28 DIAGNOSIS — E55.9 VITAMIN D DEFICIENCY: ICD-10-CM

## 2019-01-28 DIAGNOSIS — H61.23 CERUMEN DEBRIS ON TYMPANIC MEMBRANE OF BOTH EARS: ICD-10-CM

## 2019-01-28 DIAGNOSIS — E03.9 ACQUIRED HYPOTHYROIDISM: ICD-10-CM

## 2019-01-28 DIAGNOSIS — F41.8 DEPRESSION WITH ANXIETY: ICD-10-CM

## 2019-01-28 LAB
25(OH)D3 SERPL-MCNC: 26 NG/ML (ref 30–100)
ALBUMIN SERPL BCP-MCNC: 2.7 G/DL (ref 3.5–5)
ALP SERPL-CCNC: 67 U/L (ref 46–116)
ALT SERPL W P-5'-P-CCNC: 10 U/L (ref 12–78)
ANION GAP SERPL CALCULATED.3IONS-SCNC: 8 MMOL/L (ref 4–13)
AST SERPL W P-5'-P-CCNC: 17 U/L (ref 5–45)
BASOPHILS # BLD AUTO: 0.03 THOUSANDS/ΜL (ref 0–0.1)
BASOPHILS NFR BLD AUTO: 0 % (ref 0–1)
BILIRUB SERPL-MCNC: 0.55 MG/DL (ref 0.2–1)
BUN SERPL-MCNC: 23 MG/DL (ref 5–25)
CALCIUM SERPL-MCNC: 9.4 MG/DL (ref 8.3–10.1)
CHLORIDE SERPL-SCNC: 108 MMOL/L (ref 100–108)
CO2 SERPL-SCNC: 23 MMOL/L (ref 21–32)
CREAT SERPL-MCNC: 1.21 MG/DL (ref 0.6–1.3)
EOSINOPHIL # BLD AUTO: 0.14 THOUSAND/ΜL (ref 0–0.61)
EOSINOPHIL NFR BLD AUTO: 2 % (ref 0–6)
ERYTHROCYTE [DISTWIDTH] IN BLOOD BY AUTOMATED COUNT: 13.5 % (ref 11.6–15.1)
GFR SERPL CREATININE-BSD FRML MDRD: 38 ML/MIN/1.73SQ M
GLUCOSE P FAST SERPL-MCNC: 88 MG/DL (ref 65–99)
HCT VFR BLD AUTO: 36.8 % (ref 34.8–46.1)
HGB BLD-MCNC: 11.7 G/DL (ref 11.5–15.4)
IMM GRANULOCYTES # BLD AUTO: 0.03 THOUSAND/UL (ref 0–0.2)
IMM GRANULOCYTES NFR BLD AUTO: 0 % (ref 0–2)
LYMPHOCYTES # BLD AUTO: 1.5 THOUSANDS/ΜL (ref 0.6–4.47)
LYMPHOCYTES NFR BLD AUTO: 21 % (ref 14–44)
MCH RBC QN AUTO: 31.1 PG (ref 26.8–34.3)
MCHC RBC AUTO-ENTMCNC: 31.8 G/DL (ref 31.4–37.4)
MCV RBC AUTO: 98 FL (ref 82–98)
MONOCYTES # BLD AUTO: 0.65 THOUSAND/ΜL (ref 0.17–1.22)
MONOCYTES NFR BLD AUTO: 9 % (ref 4–12)
NEUTROPHILS # BLD AUTO: 4.91 THOUSANDS/ΜL (ref 1.85–7.62)
NEUTS SEG NFR BLD AUTO: 68 % (ref 43–75)
NRBC BLD AUTO-RTO: 0 /100 WBCS
PLATELET # BLD AUTO: 227 THOUSANDS/UL (ref 149–390)
PMV BLD AUTO: 11.4 FL (ref 8.9–12.7)
POTASSIUM SERPL-SCNC: 4.5 MMOL/L (ref 3.5–5.3)
PROT SERPL-MCNC: 5.5 G/DL (ref 6.4–8.2)
RBC # BLD AUTO: 3.76 MILLION/UL (ref 3.81–5.12)
SODIUM SERPL-SCNC: 139 MMOL/L (ref 136–145)
WBC # BLD AUTO: 7.26 THOUSAND/UL (ref 4.31–10.16)

## 2019-01-28 PROCEDURE — 36415 COLL VENOUS BLD VENIPUNCTURE: CPT

## 2019-01-28 PROCEDURE — 80053 COMPREHEN METABOLIC PANEL: CPT

## 2019-01-28 PROCEDURE — 85025 COMPLETE CBC W/AUTO DIFF WBC: CPT

## 2019-01-28 PROCEDURE — 82306 VITAMIN D 25 HYDROXY: CPT

## 2019-02-10 DIAGNOSIS — I10 ESSENTIAL HYPERTENSION: Primary | ICD-10-CM

## 2019-02-10 RX ORDER — LISINOPRIL 10 MG/1
TABLET ORAL
Qty: 90 TABLET | Refills: 0 | Status: SHIPPED | OUTPATIENT
Start: 2019-02-10

## 2019-02-11 DIAGNOSIS — K59.04 CHRONIC IDIOPATHIC CONSTIPATION: Primary | ICD-10-CM

## 2019-02-11 RX ORDER — DOCUSATE SODIUM 100 MG/1
CAPSULE, LIQUID FILLED ORAL
Qty: 30 CAPSULE | Refills: 5 | Status: SHIPPED | OUTPATIENT
Start: 2019-02-11

## 2019-02-17 DIAGNOSIS — E03.9 ACQUIRED HYPOTHYROIDISM: Primary | ICD-10-CM

## 2019-02-17 RX ORDER — LEVOTHYROXINE SODIUM 0.07 MG/1
TABLET ORAL
Qty: 90 TABLET | Refills: 0 | Status: SHIPPED | OUTPATIENT
Start: 2019-02-17

## 2019-02-19 DIAGNOSIS — F32.A DEPRESSION, UNSPECIFIED DEPRESSION TYPE: ICD-10-CM

## 2019-02-19 RX ORDER — MIRTAZAPINE 30 MG/1
TABLET, FILM COATED ORAL
Qty: 90 TABLET | Refills: 0 | Status: SHIPPED | OUTPATIENT
Start: 2019-02-19

## 2019-05-11 DIAGNOSIS — I10 ESSENTIAL HYPERTENSION: ICD-10-CM

## 2019-05-11 RX ORDER — LISINOPRIL 10 MG/1
TABLET ORAL
Qty: 90 TABLET | Refills: 0 | OUTPATIENT
Start: 2019-05-11

## 2019-12-02 NOTE — PROGRESS NOTES
Assessment/Plan:    Hypothyroidism    The lab so we can adjust medication  Essential hypertension    Excellent control for her age I got a blood pressure 130/80  Depression with anxiety    Is in remission continue on Remeron she says she has gone" to all but she is still hanging in there ambulating with a walker no complaints in joining a extensive family of Children's and grandchildren         Problem List Items Addressed This Visit     Essential hypertension - Primary       Excellent control for her age I got a blood pressure 130/80  Relevant Orders    CBC and differential    Comprehensive metabolic panel    Lipid Panel with Direct LDL reflex    TSH, 3rd generation with T4 reflex    Urinalysis with reflex to microscopic    Vitamin D 25 hydroxy    Gamma GT    Magnesium    Hypothyroidism       The lab so we can adjust medication  Relevant Orders    CBC and differential    Comprehensive metabolic panel    Lipid Panel with Direct LDL reflex    TSH, 3rd generation with T4 reflex    Urinalysis with reflex to microscopic    Vitamin D 25 hydroxy    Gamma GT    Magnesium    Depression with anxiety       Is in remission continue on Remeron she says she has gone" to all but she is still hanging in there ambulating with a walker no complaints in joining a extensive family of Children's and grandchildren         Relevant Orders    CBC and differential    Comprehensive metabolic panel    Lipid Panel with Direct LDL reflex    TSH, 3rd generation with T4 reflex    Urinalysis with reflex to microscopic    Vitamin D 25 hydroxy    Gamma GT    Magnesium            Subjective:      Patient ID: Bozena Lee is a 80 y o  female      Chief Complaint   Patient presents with    Follow-up         Current Outpatient Prescriptions:     aspirin (ASPIR-81) 81 mg EC tablet, Take 1 tablet by mouth daily, Disp: , Rfl:     Cholecalciferol (VITAMIN D3) 1000 units CAPS, Take 1 tablet by mouth daily, Disp: , Rfl:    levothyroxine 75 mcg tablet, TAKE 1 TABLET DAILY  , Disp: 90 tablet, Rfl: 0    lisinopril (ZESTRIL) 10 mg tablet, TAKE 1 TABLET DAILY AS DIRECTED, Disp: 90 tablet, Rfl: 0    mirtazapine (REMERON) 30 mg tablet, TAKE 1 TABLET DAILY AT BEDTIME, Disp: 90 tablet, Rfl: 0     Problem 1  High blood pressure well controlled no chest palpitation shortness of breath  Problem 2  Hypothyroidism her TSH and T4 was done back in May of 2017 I again asked her to please go for labs and get some lab work done I will talk to the daughter  Problem 3  Depression is in remission with Remeron  She ambulates with a walker she has not falling she is doing great for be 80years of age  The following portions of the patient's history were reviewed and updated as appropriate: allergies, current medications, past family history, past medical history, past social history, past surgical history and problem list     Review of Systems   Constitutional: Negative  Negative for activity change, appetite change, fatigue, fever and unexpected weight change  HENT: Negative for congestion, ear pain, hearing loss, mouth sores, postnasal drip, rhinorrhea, sore throat, trouble swallowing and voice change  Eyes: Negative for pain, redness and visual disturbance  Respiratory: Negative for cough, chest tightness, shortness of breath and wheezing  Cardiovascular: Negative for chest pain, palpitations and leg swelling  Gastrointestinal: Negative for abdominal distention, abdominal pain, blood in stool, constipation, diarrhea and nausea  Endocrine: Negative for cold intolerance, heat intolerance, polydipsia, polyphagia and polyuria  Genitourinary: Negative for difficulty urinating, dysuria, flank pain, frequency, hematuria and urgency  Musculoskeletal: Negative for arthralgias, back pain, gait problem, joint swelling and myalgias  Skin: Negative for color change and pallor     Neurological: Negative for dizziness, tremors, seizures, syncope, weakness, numbness and headaches  Hematological: Negative for adenopathy  Does not bruise/bleed easily  Psychiatric/Behavioral: Negative  Negative for sleep disturbance  The patient is not nervous/anxious  Objective:    Results for orders placed or performed in visit on 05/03/17   TSH+Free T4 (Historical)   Result Value Ref Range    TSH 3RD GENERATON 2 780 0 450 - 4 500 uIU/mL    Free T4 1 63 0 82 - 1 77 ng/dL       /69 (BP Location: Left arm, Patient Position: Sitting, Cuff Size: Standard)   Pulse 61   Temp (!) 97 4 °F (36 3 °C)   Resp 15   Wt 63 kg (138 lb 12 8 oz)   BMI 27 57 kg/m²      Physical Exam   Constitutional: She is oriented to person, place, and time  She appears well-developed and well-nourished  HENT:   Head: Normocephalic  Right Ear: External ear normal    Left Ear: External ear normal    Nose: Nose normal    Mouth/Throat: Oropharynx is clear and moist  No oropharyngeal exudate  Eyes: Conjunctivae and EOM are normal  Pupils are equal, round, and reactive to light  Neck: Normal range of motion  Neck supple  No thyromegaly present  Cardiovascular: Normal rate, regular rhythm, normal heart sounds and intact distal pulses  Exam reveals no gallop and no friction rub  No murmur heard  Blood pressure 130/80 right arm sitting   Pulmonary/Chest: Effort normal and breath sounds normal  No respiratory distress  She has no wheezes  She has no rales  Abdominal: Soft  Bowel sounds are normal  She exhibits no distension and no mass  There is no tenderness  There is no rebound and no guarding  Musculoskeletal: Normal range of motion  Lymphadenopathy:     She has no cervical adenopathy  Neurological: She is alert and oriented to person, place, and time  Skin: Skin is warm and dry  Skin is dry    Psychiatric: She has a normal mood and affect  Her behavior is normal  Judgment normal    Nursing note and vitals reviewed  no

## 2021-03-03 NOTE — PROGRESS NOTES
Called Darrin clarke and discussed x-rays. Continue TLSO brace. Follow up in 2 months with repeat thoracic and lumbar x-rays. Patient called me into the room to ask to call her daughter again, with the ok from charge nurse we called and left an answer machine message  So number on file is a home phone number  Patient continue's to state that she is, "so sad"  Will continue to monitor
